# Patient Record
Sex: MALE | Race: WHITE | Employment: PART TIME | URBAN - METROPOLITAN AREA
[De-identification: names, ages, dates, MRNs, and addresses within clinical notes are randomized per-mention and may not be internally consistent; named-entity substitution may affect disease eponyms.]

---

## 2018-10-03 ENCOUNTER — HOSPITAL ENCOUNTER (OUTPATIENT)
Dept: GENERAL RADIOLOGY | Age: 38
Discharge: HOME | End: 2018-10-03
Payer: COMMERCIAL

## 2018-10-03 DIAGNOSIS — M47.816 SPONDYLOSIS OF LUMBAR REGION WITHOUT MYELOPATHY OR RADICULOPATHY: ICD-10-CM

## 2018-10-03 DIAGNOSIS — M54.6 PAIN IN THORACIC SPINE: ICD-10-CM

## 2018-10-03 DIAGNOSIS — M54.50 LOW BACK PAIN: ICD-10-CM

## 2018-10-03 PROCEDURE — 72110 X-RAY EXAM L-2 SPINE 4/>VWS: CPT

## 2018-10-03 PROCEDURE — 72072 X-RAY EXAM THORAC SPINE 3VWS: CPT

## 2020-06-16 ENCOUNTER — ED HISTORICAL/CONVERTED ENCOUNTER (OUTPATIENT)
Dept: OTHER | Age: 40
End: 2020-06-16

## 2020-06-25 ENCOUNTER — OP HISTORICAL/CONVERTED ENCOUNTER (OUTPATIENT)
Dept: OTHER | Age: 40
End: 2020-06-25

## 2020-07-09 ENCOUNTER — OP HISTORICAL/CONVERTED ENCOUNTER (OUTPATIENT)
Dept: OTHER | Age: 40
End: 2020-07-09

## 2020-07-25 ENCOUNTER — ED HISTORICAL/CONVERTED ENCOUNTER (OUTPATIENT)
Dept: OTHER | Age: 40
End: 2020-07-25

## 2020-09-16 VITALS
WEIGHT: 270 LBS | BODY MASS INDEX: 33.57 KG/M2 | SYSTOLIC BLOOD PRESSURE: 150 MMHG | HEIGHT: 75 IN | TEMPERATURE: 98.2 F | DIASTOLIC BLOOD PRESSURE: 94 MMHG

## 2020-09-16 RX ORDER — SULFAMETHOXAZOLE AND TRIMETHOPRIM 800; 160 MG/1; MG/1
1 TABLET ORAL 2 TIMES DAILY
COMMUNITY

## 2020-09-16 RX ORDER — ATOMOXETINE 40 MG/1
40 CAPSULE ORAL DAILY
COMMUNITY

## 2020-09-16 RX ORDER — HYDROCODONE BITARTRATE AND ACETAMINOPHEN 5; 325 MG/1; MG/1
TABLET ORAL
COMMUNITY

## 2020-09-16 RX ORDER — DIVALPROEX SODIUM 250 MG/1
TABLET, DELAYED RELEASE ORAL 3 TIMES DAILY
COMMUNITY

## 2020-09-16 RX ORDER — DULOXETIN HYDROCHLORIDE 20 MG/1
20 CAPSULE, DELAYED RELEASE ORAL DAILY
COMMUNITY

## 2020-09-16 RX ORDER — TAMSULOSIN HYDROCHLORIDE 0.4 MG/1
0.4 CAPSULE ORAL DAILY
COMMUNITY

## 2020-09-16 RX ORDER — PREDNISONE 5 MG/1
TABLET ORAL
COMMUNITY

## 2020-09-16 RX ORDER — ONDANSETRON 4 MG/1
4 TABLET, ORALLY DISINTEGRATING ORAL
COMMUNITY

## 2020-09-16 RX ORDER — MELOXICAM 15 MG/1
15 TABLET ORAL DAILY
COMMUNITY

## 2020-09-16 RX ORDER — GABAPENTIN 400 MG/1
400 CAPSULE ORAL 3 TIMES DAILY
COMMUNITY

## 2022-06-02 ENCOUNTER — HOSPITAL ENCOUNTER (EMERGENCY)
Age: 42
Discharge: HOME | End: 2022-06-02
Attending: EMERGENCY MEDICINE
Payer: MEDICAID

## 2022-06-02 VITALS
DIASTOLIC BLOOD PRESSURE: 90 MMHG | SYSTOLIC BLOOD PRESSURE: 139 MMHG | BODY MASS INDEX: 32.08 KG/M2 | WEIGHT: 258 LBS | OXYGEN SATURATION: 98 % | HEART RATE: 86 BPM | TEMPERATURE: 97.9 F | HEIGHT: 75 IN | RESPIRATION RATE: 14 BRPM

## 2022-06-02 DIAGNOSIS — J02.9 PHARYNGITIS, UNSPECIFIED ETIOLOGY: ICD-10-CM

## 2022-06-02 DIAGNOSIS — B34.9 VIRAL ILLNESS: ICD-10-CM

## 2022-06-02 DIAGNOSIS — H10.32 ACUTE CONJUNCTIVITIS OF LEFT EYE, UNSPECIFIED ACUTE CONJUNCTIVITIS TYPE: Primary | ICD-10-CM

## 2022-06-02 DIAGNOSIS — I10 HYPERTENSION, UNSPECIFIED TYPE: ICD-10-CM

## 2022-06-02 LAB
DEPRECATED S PYO AG THROAT QL EIA: NEGATIVE
FLUAV AG NPH QL IA: NEGATIVE
FLUBV AG NOSE QL IA: NEGATIVE

## 2022-06-02 PROCEDURE — 87804 INFLUENZA ASSAY W/OPTIC: CPT

## 2022-06-02 PROCEDURE — 87880 STREP A ASSAY W/OPTIC: CPT

## 2022-06-02 PROCEDURE — 87070 CULTURE OTHR SPECIMN AEROBIC: CPT

## 2022-06-02 PROCEDURE — 99283 EMERGENCY DEPT VISIT LOW MDM: CPT

## 2022-06-02 RX ORDER — IBUPROFEN 600 MG/1
600 TABLET ORAL
Qty: 20 TABLET | Refills: 0 | Status: SHIPPED | OUTPATIENT
Start: 2022-06-02

## 2022-06-02 RX ORDER — GENTAMICIN SULFATE 3 MG/ML
2 SOLUTION/ DROPS OPHTHALMIC EVERY 4 HOURS
Qty: 3 ML | Refills: 0 | Status: SHIPPED | OUTPATIENT
Start: 2022-06-02 | End: 2022-06-07

## 2022-06-02 NOTE — ED PROVIDER NOTES
EMERGENCY DEPARTMENT HISTORY AND PHYSICAL EXAM      Date: 6/2/2022  Patient Name: Laron Hutton III    History of Presenting Illness     Chief Complaint   Patient presents with    Sore Throat    Red Eye       History Provided By: Patient    HPI: Gold Cisse, 35 y.o. male with a past medical history significant diabetes and hypertension presents to the ED with cc of Left eye discharge 1 day, nasal congestion and sore throat, 3 days. Nothing helps. No anosmia or ageusia. No fever or chills. He is not CoVID vaccinated. There are no other complaints, changes, or physical findings at this time. PCP: None    No current facility-administered medications on file prior to encounter. Current Outpatient Medications on File Prior to Encounter   Medication Sig Dispense Refill    atomoxetine (STRATTERA) 40 mg capsule Take 40 mg by mouth daily.  divalproex DR (DEPAKOTE) 250 mg tablet Take  by mouth three (3) times daily.  DULoxetine (CYMBALTA) 20 mg capsule Take 20 mg by mouth daily.  gabapentin (NEURONTIN) 400 mg capsule Take 400 mg by mouth three (3) times daily.  HYDROcodone-acetaminophen (NORCO) 5-325 mg per tablet Take  by mouth.  meloxicam (MOBIC) 15 mg tablet Take 15 mg by mouth daily.  ondansetron (ZOFRAN ODT) 4 mg disintegrating tablet Take 4 mg by mouth every eight (8) hours as needed for Nausea or Vomiting.  predniSONE (DELTASONE) 5 mg tablet Take  by mouth.  trimethoprim-sulfamethoxazole (BACTRIM DS, SEPTRA DS) 160-800 mg per tablet Take 1 Tab by mouth two (2) times a day.  tamsulosin (FLOMAX) 0.4 mg capsule Take 0.4 mg by mouth daily.          Past History     Past Medical History:  Past Medical History:   Diagnosis Date    Arthritis     Asthma     Bone disease     Calculus of kidney        Past Surgical History:  Past Surgical History:   Procedure Laterality Date    HX HEENT      wisdom teeth    HX OTHER SURGICAL      procedure on wrist Family History:  Family History   Problem Relation Age of Onset    Diabetes Father        Social History:  Social History     Tobacco Use    Smoking status: Former Smoker     Quit date: 2006     Years since quittin.0    Smokeless tobacco: Never Used   Vaping Use    Vaping Use: Never used   Substance Use Topics    Alcohol use: Yes     Comment: occaisonal    Drug use: Never       Allergies:  No Known Allergies      Review of Systems     Review of Systems   Constitutional: Negative. HENT: Positive for congestion and sore throat. Eyes: Positive for discharge. Respiratory: Negative. Cardiovascular: Negative. Gastrointestinal: Negative. Genitourinary: Negative. Neurological: Negative. All other systems reviewed and are negative. Physical Exam     Physical Exam  Vitals and nursing note reviewed. Constitutional:       Appearance: He is well-developed. HENT:      Head: Normocephalic. Nose: Congestion present. No rhinorrhea. Mouth/Throat:      Mouth: Mucous membranes are moist.      Pharynx: No oropharyngeal exudate or posterior oropharyngeal erythema. Eyes:      Comments: Left injected. Cardiovascular:      Rate and Rhythm: Normal rate and regular rhythm. Pulmonary:      Effort: Pulmonary effort is normal.      Breath sounds: Normal breath sounds. Neurological:      Mental Status: He is alert.          Lab and Diagnostic Study Results     Labs -     Recent Results (from the past 12 hour(s))   INFLUENZA A & B AG (RAPID TEST)    Collection Time: 22  7:52 AM   Result Value Ref Range    Influenza A Antigen Negative Negative      Influenza B Antigen Negative Negative     STREP AG SCREEN, GROUP A    Collection Time: 22  7:52 AM    Specimen: Throat   Result Value Ref Range    Group A Strep Ag ID Negative         Radiologic Studies -   @lastxrresult@  CT Results  (Last 48 hours)    None        CXR Results  (Last 48 hours)    None            Medical Decision Making   - I am the first provider for this patient. - I reviewed the vital signs, available nursing notes, past medical history, past surgical history, family history and social history. - Initial assessment performed. The patients presenting problems have been discussed, and they are in agreement with the care plan formulated and outlined with them. I have encouraged them to ask questions as they arise throughout their visit. Vital Signs-Reviewed the patient's vital signs. Patient Vitals for the past 12 hrs:   Temp Pulse Resp BP SpO2   06/02/22 0711 97.9 °F (36.6 °C) 86 14 (!) 139/90 98 %       Records Reviewed: Nursing Notes    The patient presents with       ED Course:          Provider Notes (Medical Decision Making): MDM       Procedures   Medical Decision Makingedical Decision Making  Performed by: Nery Guzman MD  PROCEDURES:  Procedures       Disposition   Disposition: DC- Adult Discharges: All of the diagnostic tests were reviewed and questions answered. Diagnosis, care plan and treatment options were discussed. The patient understands the instructions and will follow up as directed. The patients results have been reviewed with them. They have been counseled regarding their diagnosis. The patient verbally convey understanding and agreement of the signs, symptoms, diagnosis, treatment and prognosis and additionally agrees to follow up as recommended with their PCP in 24 - 48 hours. They also agree with the care-plan and convey that all of their questions have been answered. I have also put together some discharge instructions for them that include: 1) educational information regarding their diagnosis, 2) how to care for their diagnosis at home, as well a 3) list of reasons why they would want to return to the ED prior to their follow-up appointment, should their condition change. Discharged    DISCHARGE PLAN:  1.    Current Discharge Medication List      CONTINUE these medications which have NOT CHANGED    Details   atomoxetine (STRATTERA) 40 mg capsule Take 40 mg by mouth daily. divalproex DR (DEPAKOTE) 250 mg tablet Take  by mouth three (3) times daily. DULoxetine (CYMBALTA) 20 mg capsule Take 20 mg by mouth daily. gabapentin (NEURONTIN) 400 mg capsule Take 400 mg by mouth three (3) times daily. HYDROcodone-acetaminophen (NORCO) 5-325 mg per tablet Take  by mouth.      meloxicam (MOBIC) 15 mg tablet Take 15 mg by mouth daily. ondansetron (ZOFRAN ODT) 4 mg disintegrating tablet Take 4 mg by mouth every eight (8) hours as needed for Nausea or Vomiting. predniSONE (DELTASONE) 5 mg tablet Take  by mouth. trimethoprim-sulfamethoxazole (BACTRIM DS, SEPTRA DS) 160-800 mg per tablet Take 1 Tab by mouth two (2) times a day. tamsulosin (FLOMAX) 0.4 mg capsule Take 0.4 mg by mouth daily. 2.   Follow-up Information     Follow up With Specialties Details Why Contact Info    Rishi Wang MD Internal Medicine Physician In 1 week  [address] [place] [address] [phone]          3. Return to ED if worse   4. Current Discharge Medication List      START taking these medications    Details   gentamicin (GARAMYCIN) 0.3 % ophthalmic solution Administer 2 Drops to left eye every four (4) hours for 5 days. Qty: 3 mL, Refills: 0  Start date: 6/2/2022, End date: 6/7/2022      benzocaine-menthoL (Cepacol Sore Throat, danisha-men,) 15-2.6 mg lozg lozenge Take 1 Lozenge by mouth every two (2) hours as needed for Sore throat. Qty: 15 Lozenge, Refills: 0  Start date: 6/2/2022      ibuprofen (MOTRIN) 600 mg tablet Take 1 Tablet by mouth every six (6) hours as needed for Pain. Qty: 20 Tablet, Refills: 0  Start date: 6/2/2022               Diagnosis     Clinical Impression:   1. Acute conjunctivitis of left eye, unspecified acute conjunctivitis type    2. Pharyngitis, unspecified etiology    3. Hypertension, unspecified type    4.  Viral illness        Attestations:    Cindy Gallegos MD    Please note that this dictation was completed with [employer], the computer voice recognition software. Quite often unanticipated grammatical, syntax, homophones, and other interpretive errors are inadvertently transcribed by the computer software. Please disregard these errors. Please excuse any errors that have escaped final proofreading. Thank you.

## 2022-06-03 LAB
BACTERIA SPEC CULT: NORMAL
SPECIAL REQUESTS,SREQ: NORMAL

## 2022-11-15 ENCOUNTER — NURSE TRIAGE (OUTPATIENT)
Dept: OTHER | Facility: CLINIC | Age: 42
End: 2022-11-15

## 2022-11-15 ENCOUNTER — OFFICE VISIT (OUTPATIENT)
Dept: FAMILY MEDICINE CLINIC | Age: 42
End: 2022-11-15
Payer: MEDICAID

## 2022-11-15 VITALS
DIASTOLIC BLOOD PRESSURE: 93 MMHG | SYSTOLIC BLOOD PRESSURE: 149 MMHG | HEART RATE: 90 BPM | TEMPERATURE: 97.1 F | WEIGHT: 257 LBS | BODY MASS INDEX: 31.95 KG/M2 | HEIGHT: 75 IN | RESPIRATION RATE: 16 BRPM | OXYGEN SATURATION: 97 %

## 2022-11-15 DIAGNOSIS — I10 PRIMARY HYPERTENSION: Primary | ICD-10-CM

## 2022-11-15 DIAGNOSIS — Z76.89 ENCOUNTER TO ESTABLISH CARE: ICD-10-CM

## 2022-11-15 PROCEDURE — 99204 OFFICE O/P NEW MOD 45 MIN: CPT

## 2022-11-15 PROCEDURE — 3075F SYST BP GE 130 - 139MM HG: CPT

## 2022-11-15 PROCEDURE — 3079F DIAST BP 80-89 MM HG: CPT

## 2022-11-15 RX ORDER — LOSARTAN POTASSIUM 25 MG/1
25 TABLET ORAL DAILY
Qty: 30 TABLET | Refills: 1 | Status: SHIPPED | OUTPATIENT
Start: 2022-11-15

## 2022-11-15 NOTE — PROGRESS NOTES
[place] is a 38 y.o. male    Chief Complaint   Patient presents with    Establish Care    Hypertension       1. Have you been to the ER, urgent care clinic since your last visit? Hospitalized since your last visit? No  2. Have you seen or consulted any other health care providers outside of the [address] [place] [address] since your last visit? Include any pap smears or colon screening.  No    Visit Vitals  BP (!) 149/93 (BP 1 Location: Left upper arm, BP Patient Position: Sitting)   Pulse 90   Temp 97.1 °F (36.2 °C) (Temporal)   Resp 16   Ht 6' 3\" (1.905 m)   Wt 257 lb (116.6 kg)   SpO2 97%   BMI 32.12 kg/m²     3 most recent PHQ Screens 11/15/2022   Little interest or pleasure in doing things Several days   Feeling down, depressed, irritable, or hopeless Several days   Total Score PHQ 2 2   Trouble falling or staying asleep, or sleeping too much More than half the days   Feeling tired or having little energy Nearly every day   Poor appetite, weight loss, or overeating Not at all   Feeling bad about yourself - or that you are a failure or have let yourself or your family down Several days   Trouble concentrating on things such as school, work, reading, or watching TV Nearly every day   Moving or speaking so slowly that other people could have noticed; or the opposite being so fidgety that others notice Several days   Thoughts of being better off dead, or hurting yourself in some way Not at all   PHQ 9 Score 12   How difficult have these problems made it for you to do your work, take care of your home and get along with others Somewhat difficult     Health Maintenance Due   Topic Date Due    Hepatitis C Screening  Never done    DTaP/Tdap/Td series (1 - Tdap) Never done     Has been told in the past he has had high bp never been on medication    Previous PCP:  Dr. Pina Aguero in [address] , [place] [address]

## 2022-11-15 NOTE — PROGRESS NOTES
Subjective   Pancho Pichardo III is an 38 y.o. male who presents to establish care. Patient was previously receiving care at: , Saw him last a year ago. Currently not having any complaints. Not currently taking any medications. Alchohol consumption  - Drinks a liter of liquor every two weeks. 2. Elevated BP  - Previously diagnosed with hypertension, but never been on medication for it. Was recently at a dentist for a root canal and he was found to have a blood pressure of 180/108. They told him at that visit that they cannot proceed with the root canal until he has been seen by his PCP and treated for the BP.  - Has a BP machine at home, but has not been checking it. - Says he is too scared to check his blood pressure. - Denies any drug use, never smoked. - Feels tired often. Has been told he snores in his sleep. Stop-bang score is 7/8.    3. Possible diabetes  - Says he has been diagnosed with borderline diabetes in the past. Never been on medicines for it. - Eats diet heavy in meat, canned fruit, sandwiches, pizza, steak, potatoes. Doesn't eat junk food often. - Drinks mostly milk and juice. Some water. 4. Asthma  - Has not had any asthma attacks since he has been young. Sometimes feels a little short of breath but those go away spontaneously. Does not have a rescue inhaler and has not needed one. 5. Psych  - Patient says he has been diagnosed with Depression and Bipolar Disorder. He is not currently on any medications. Was diagnosed by a \"therapist\" that he no longer sees. He stopped seeing them when they took him off a medication that was working for him. He says that he was taken off it due to risk of abuse. Cannot remember the name of that medication. Review of Systems   Negative except per HPI. Current Medications  Current medications include:   Current Outpatient Medications   Medication Sig    losartan (COZAAR) 25 mg tablet Take 1 Tablet by mouth daily. benzocaine-menthoL (Cepacol Sore Throat, danisha-men,) 15-2.6 mg lozg lozenge Take 1 Lozenge by mouth every two (2) hours as needed for Sore throat. (Patient not taking: Reported on 11/15/2022)    ibuprofen (MOTRIN) 600 mg tablet Take 1 Tablet by mouth every six (6) hours as needed for Pain. (Patient not taking: Reported on 11/15/2022)    atomoxetine (STRATTERA) 40 mg capsule Take 40 mg by mouth daily. (Patient not taking: Reported on 11/15/2022)    divalproex DR (DEPAKOTE) 250 mg tablet Take  by mouth three (3) times daily. (Patient not taking: Reported on 11/15/2022)    DULoxetine (CYMBALTA) 20 mg capsule Take 20 mg by mouth daily. (Patient not taking: Reported on 11/15/2022)    gabapentin (NEURONTIN) 400 mg capsule Take 400 mg by mouth three (3) times daily. (Patient not taking: Reported on 11/15/2022)    HYDROcodone-acetaminophen (NORCO) 5-325 mg per tablet Take  by mouth. (Patient not taking: Reported on 11/15/2022)    meloxicam (MOBIC) 15 mg tablet Take 15 mg by mouth daily. (Patient not taking: Reported on 11/15/2022)    ondansetron (ZOFRAN ODT) 4 mg disintegrating tablet Take 4 mg by mouth every eight (8) hours as needed for Nausea or Vomiting. (Patient not taking: Reported on 11/15/2022)    predniSONE (DELTASONE) 5 mg tablet Take  by mouth. (Patient not taking: Reported on 11/15/2022)    tamsulosin (FLOMAX) 0.4 mg capsule Take 0.4 mg by mouth daily. (Patient not taking: Reported on 11/15/2022)     No current facility-administered medications for this visit.        Allergies  No Known Allergies    Past Medical History  Past Medical History:   Diagnosis Date    Arthritis     Asthma     Bone disease     Calculus of kidney        Past Surgical History   Past Surgical History:   Procedure Laterality Date    HX HEENT      wisdom teeth    HX OTHER SURGICAL      procedure on wrist        Family History  Family History   Problem Relation Age of Onset    Diabetes Father        Social History  Social History Socioeconomic History    Marital status: LEGALLY      Spouse name: Not on file    Number of children: Not on file    Years of education: Not on file    Highest education level: Not on file   Occupational History    Not on file   Tobacco Use    Smoking status: Former     Types: Cigarettes     Quit date: 2006     Years since quittin.4    Smokeless tobacco: Never   Vaping Use    Vaping Use: Never used   Substance and Sexual Activity    Alcohol use: Yes     Comment: occaisonal    Drug use: Never    Sexual activity: Not on file   Other Topics Concern    Not on file   Social History Narrative    Not on file     Social Determinants of Health     Financial Resource Strain: Not on file   Food Insecurity: Not on file   Transportation Needs: Not on file   Physical Activity: Not on file   Stress: Not on file   Social Connections: Not on file   Intimate Partner Violence: Not on file   Housing Stability: Not on file       Immunizations    There is no immunization history on file for this patient. Health Maintenance  Colonoscopy : never done  HIV testing : never done    Objective   Vital Signs  Visit Vitals  BP (!) 143/85 (BP 1 Location: Right arm, BP Patient Position: Sitting)   Pulse 90   Temp 97.1 °F (36.2 °C) (Temporal)   Resp 16   Ht 6' 3\" (1.905 m)   Wt 257 lb (116.6 kg)   SpO2 97%   BMI 32.12 kg/m²       Physical Exam  HENT:      Mouth/Throat:      Mouth: Mucous membranes are moist.      Pharynx: Oropharynx is clear. Eyes:      Extraocular Movements: Extraocular movements intact. Conjunctiva/sclera: Conjunctivae normal.      Pupils: Pupils are equal, round, and reactive to light. Cardiovascular:      Rate and Rhythm: Normal rate and regular rhythm. Pulses: Normal pulses. Heart sounds: Normal heart sounds. Pulmonary:      Effort: Pulmonary effort is normal.      Breath sounds: Normal breath sounds. Abdominal:      General: Abdomen is flat.  Bowel sounds are normal. Palpations: Abdomen is soft. Skin:     General: Skin is warm and dry. Neurological:      Mental Status: He is alert and oriented to person, place, and time. Psychiatric:         Mood and Affect: Mood normal.         Behavior: Behavior normal.       Assessment:   Vanita Valentino III is a 38 y.o. here to establish to care     Plan:     1. Encounter to establish care      2. BMI 32.0-32.9,adult  - LIPID PANEL; Future  - HEMOGLOBIN A1C WITH EAG; Future    3. Primary hypertension  - SLEEP MEDICINE REFERRAL  - METABOLIC PANEL, COMPREHENSIVE; Future  - MICROALBUMIN, UR, RAND W/ MICROALB/CREAT RATIO; Future  - losartan (COZAAR) 25 mg tablet; Take 1 Tablet by mouth daily. Dispense: 30 Tablet; Refill: 1     - Patient has two documented blood pressure values, here and at the dentist. The BP re-check at this visit is 149/93. This qualifies for a diagnosis of hypertension.   - Will obtain CMP, microalbumin:cr ratio, A1c, and lipid panel  - Will start on losartan 25 mg PO daily. Patient to follow up in three weeks to recheck his BP.  - Will request records from the last PCP. -Patient informed about [facility], where they can find information about what foods are encouraged as part of a healthy diet, as well as shopping lists and meal plans. This information can be found on the website under the \"patient\" tab. They have also been given a handout on the \"Six Pillars of Lifestyle Medicine\" provided on the website for review. This handout discusses the following and the role they play in improving health and wellbeing:   Whole food, plant-based nutrition  Physical activity  Stress management  Avoidance of risky substances  Restorative sleep  Social connection  Patient has been encouraged to review this information and to schedule a follow up appointment to address any of these six pillars that we can help them improve. I discussed the aforementioned diagnoses with the patient as well as the plan of care. All questions were answered and an AVS was provided.      I discussed this patient with Dr. Jorge Shaw MD (Attending Physician)      Signed By:  Tani Miller MD

## 2022-11-15 NOTE — TELEPHONE ENCOUNTER
Location of [place]: [place]    Received call from YOSELIN at [facility] with [employer] Complaint; Patient with Red Flag Complaint requesting to establish care with [facility] . Subjective: Caller states \"Dentist told me to follow up with PCP for high blood pressure. They are unable to do any procedures until it is under control\"     Current Symptoms: 160/100s on home cuff. Readings at dentist was lowest of 170/100 and highest of 180/103. Intermittent double vision and headaches. Onset: a few days ago; unchanged    Associated Symptoms: NA    Pain Severity: 0/10; Temperature: No     What has been tried: NA    LMP: NA Pregnant: NA    Recommended disposition: See PCP within 3 Days    Care advice provided, patient verbalizes understanding; denies any other questions or concerns; instructed to call back for any new or worsening symptoms. Patient/Caller agrees with recommended disposition; writer provided warm transfer to [facility] at [facility] for appointment scheduling    Attention Provider: Thank you for allowing me to participate in the care of your patient. The patient was connected to triage in response to information provided to the [facility]. Please do not respond through this encounter as the response is not directed to a shared pool.

## 2022-11-15 NOTE — TELEPHONE ENCOUNTER
Location of patient: {specify State}    Received call from *** at {ECC:23226} [facility] with Red Flag Complaint. Subjective: Caller states \"{type here}\"     Current Symptoms: ***    Onset: {NUMBERS; 0-10:10463} {TIME UNITS:11} ago; {DESC; IEEGS:03288}    Associated Symptoms: {GEN ADL:81207}    Pain Severity: {NUMBERS; 0-10:5044}/10; {PAIN QUALITY:76633205}; {Desc; pain severity:62068}    Temperature: *** {Desc; oral/axillary/rectal:55026}    What has been tried: ***    LMP: {ENC:91944} Pregnant: {YES NO UNKN XR:35055}    Recommended disposition: {Clinical Disposition:53637}    Care advice provided, patient verbalizes understanding; denies any other questions or concerns; instructed to call back for any new or worsening symptoms. {OUTCOME:50237}    Attention Provider: Thank you for allowing me to participate in the care of your patient. The patient was connected to triage in response to information provided to the [facility]. Please do not respond through this encounter as the response is not directed to a shared pool.     Reminders:     Beginning 5/23/22 - document all [facility] patients in [facility]; call origin: [facility]    Call [address] [place] [address] [address] Provider/Office    Care Advice - both instruction and documentation    Routing - PCP     PLEASE DELETE ALL RED TEXT PRIOR TO SIGNING NOTE    Reason for Disposition   Systolic BP >= 061 OR Diastolic >= 462    Protocols used: Blood Pressure - High-ADULT-[place]

## 2022-11-16 LAB
ALBUMIN SERPL-MCNC: 4.9 G/DL (ref 4–5)
ALBUMIN/CREAT UR: 5 MG/G CREAT (ref 0–29)
ALBUMIN/GLOB SERPL: 1.9 {RATIO} (ref 1.2–2.2)
ALP SERPL-CCNC: 134 IU/L (ref 44–121)
ALT SERPL-CCNC: 79 IU/L (ref 0–44)
AST SERPL-CCNC: 40 IU/L (ref 0–40)
BILIRUB SERPL-MCNC: 0.5 MG/DL (ref 0–1.2)
BUN SERPL-MCNC: 14 MG/DL (ref 6–24)
BUN/CREAT SERPL: 14 (ref 9–20)
CALCIUM SERPL-MCNC: 9.4 MG/DL (ref 8.7–10.2)
CHLORIDE SERPL-SCNC: 101 MMOL/L (ref 96–106)
CHOLEST SERPL-MCNC: 220 MG/DL (ref 100–199)
CO2 SERPL-SCNC: 22 MMOL/L (ref 20–29)
CREAT SERPL-MCNC: 1.01 MG/DL (ref 0.76–1.27)
CREAT UR-MCNC: 283.3 MG/DL
EGFR: 94 ML/MIN/1.73
EST. AVERAGE GLUCOSE BLD GHB EST-MCNC: 114 MG/DL
GLOBULIN SER CALC-MCNC: 2.6 G/DL (ref 1.5–4.5)
GLUCOSE SERPL-MCNC: 89 MG/DL (ref 70–99)
HBA1C MFR BLD: 5.6 % (ref 4.8–5.6)
HDLC SERPL-MCNC: 32 MG/DL
IMP & REVIEW OF LAB RESULTS: NORMAL
LDLC SERPL CALC-MCNC: 136 MG/DL (ref 0–99)
MICROALBUMIN UR-MCNC: 14.1 UG/ML
POTASSIUM SERPL-SCNC: 4.4 MMOL/L (ref 3.5–5.2)
PROT SERPL-MCNC: 7.5 G/DL (ref 6–8.5)
SODIUM SERPL-SCNC: 141 MMOL/L (ref 134–144)
TRIGL SERPL-MCNC: 287 MG/DL (ref 0–149)
VLDLC SERPL CALC-MCNC: 52 MG/DL (ref 5–40)

## 2022-11-22 ENCOUNTER — TELEPHONE (OUTPATIENT)
Dept: FAMILY MEDICINE CLINIC | Age: 42
End: 2022-11-22

## 2022-11-22 NOTE — TELEPHONE ENCOUNTER
Spoke with the patient on the phone regarding his lab results, particularly his elevated cholesterol. He has a follow up visit scheduled with me on the 6th and he mentioned that he would be open to discussing medical management of his hyperlipidemia at that visit. We will go over the medication options and make final decisions at that visit.

## 2022-11-28 ENCOUNTER — OFFICE VISIT (OUTPATIENT)
Dept: SLEEP MEDICINE | Age: 42
End: 2022-11-28
Payer: MEDICAID

## 2022-11-28 VITALS
BODY MASS INDEX: 31.77 KG/M2 | DIASTOLIC BLOOD PRESSURE: 90 MMHG | OXYGEN SATURATION: 97 % | WEIGHT: 255.5 LBS | HEIGHT: 75 IN | SYSTOLIC BLOOD PRESSURE: 141 MMHG | HEART RATE: 92 BPM

## 2022-11-28 DIAGNOSIS — I10 PRIMARY HYPERTENSION: ICD-10-CM

## 2022-11-28 DIAGNOSIS — G47.33 OSA (OBSTRUCTIVE SLEEP APNEA): Primary | ICD-10-CM

## 2022-11-28 DIAGNOSIS — F41.9 ANXIETY AND DEPRESSION: ICD-10-CM

## 2022-11-28 DIAGNOSIS — F32.A ANXIETY AND DEPRESSION: ICD-10-CM

## 2022-11-28 PROCEDURE — 3075F SYST BP GE 130 - 139MM HG: CPT | Performed by: INTERNAL MEDICINE

## 2022-11-28 PROCEDURE — 99204 OFFICE O/P NEW MOD 45 MIN: CPT | Performed by: INTERNAL MEDICINE

## 2022-11-28 PROCEDURE — 3079F DIAST BP 80-89 MM HG: CPT | Performed by: INTERNAL MEDICINE

## 2022-11-28 NOTE — PROGRESS NOTES
217 [facility] [place]. [place], [address]  Tel.  [phone]  Fax. 100 [facility] 60  [place], [address] [facility]  Tel.  [phone]  Fax. [phone] [address]  Tel.  [phone]  Fax. [phone] [facility] Tessa III is a 38y.o. year old male referred by Dr. Afshan Russell  for evaluation of a sleep disorder. ASSESSMENT/PLAN:      ICD-10-CM ICD-9-CM    1. RADHA (obstructive sleep apnea)  G47.33 327.23 SLEEP STUDY UNATTENDED, 4 CHANNEL      2. Anxiety and depression  F41.9 300.00     F32. A 311       3. Primary hypertension  I10 401.9       4. BMI 31.0-31.9,adult  Z68.31 V85.31           Patient has a history and examination consistent with the diagnosis of sleep apnea. Follow-up and Dispositions    Return for telephone follow-up after testing is completed. * The patient currently has a High Risk for having sleep apnea. STOP-BANG score 6.    * Sleep testing was ordered for initial evaluation. Orders Placed This Encounter    SLEEP STUDY UNATTENDED, 4 CHANNEL     Order Specific Question:   Reason for Exam     Answer:   RADHA       * He was provided information on sleep apnea including corresponding risk factors and the importance of proper treatment. * Treatment options were reviewed in detail. he would like to proceed with PAP therapy. Patient will be seen in follow-up in 6-8 weeks after PAP setup to gauge treatment response and adherence to therapy. * The patient was counseled regarding proper sleep hygiene, with emphasis on ensuring sufficient total sleep time; safe driving and the benefits of exercise and weight loss. * All of his questions were addressed. 2. Anxiety and Depression -  currently not on medications, he will continue to monitor his mood and follow up with his care provider for reevaluation/adjustment of medications if warranted.   I have reviewed the relationship between mood as it relates to sleep-disordered breathing. 3. Hypertension -  continue on current regimen - losartan (COZAAR) 25 mg tablet, he will continue to monitor his BP and follow up with his primary care provider for reevaluation/adjustment of medications if warranted. I have reviewed the relationship between hypertension as it relates to sleep-disordered breathing. 4. Recommended a dedicated weight loss program through appropriate diet and exercise regimen as significant weight reduction has been shown to reduce severity of obstructive sleep apnea. SUBJECTIVE/OBJECTIVE:    Marguerite Melgar III is an 38 y.o. male referred for evaluation for a sleep disorder. He complains of snoring associated with snorting, choking, periods of not breathing, tossing and turning, kicking, awakening in the middle of the night because of urination along with difficulty falling asleep once awakened. Patient reports of poor sleep quality which is not restorative, she feels tired and fatigued during the day. Symptoms began 4 years ago, unchanged since that time. He usually can fall asleep in 10 minutes to an hour. Family or house members note snoring and periods of not breathing. He denies of symptoms indicative of cataplexy, sleep paralysis or sleep related hallucinations. He denies of a history of unusual movements occurring during sleep. Marguerite Melgar III does not wake up frequently at night. He is not bothered by waking up too early and left unable to get back to sleep. He actually sleeps about   hours at night and wakes up about -2 times during the night. He does work shifts: Other Shift. Celia Christensen indicates he does get too little sleep at night. His bedtime is  (varies). He awakens at 36. He does take naps. He takes 7 naps a week lasting Other. He has the following observed behaviors: Loud snoring, Sleep talking, Pauses in breathing, Twitching of legs or feet, Grinding teeth, Kicking with legs; Other (use comments).   Other remarks: waking with a gasp or snort    The patient has not undergone diagnostic testing for the current problems. Review of Systems:  Constitutional:  No significant weight loss or weight gain  Eyes:  Intermittent blurred vision - to be evaluated by PCP  CVS:  No significant chest pain  Pulm: significant shortness of breath on exertion  GI:  No significant nausea or vomiting  : significant nocturia  Musculoskeletal:  No significant joint pain at night  Skin:  No significant rashes  Neuro:  No significant dizziness   Psych:  No active mood issues    Sleep Review of Systems: notable for Positive difficulty falling asleep; Positive awakenings at night; Negative perceived regular dreaming; Negative nightmares; Negative  early morning headaches; Negative  memory problems; Negative  concentration issues; Negative caffeine;  Negative alcohol;   Negative history of any automobile or occupational accidents due to daytime drowsiness. [place] Sleepiness Score: 21   and Modified F.O.S.Q. Score Total / 2: 11.5    Visit Vitals  BP (!) 141/90 (BP 1 Location: Left upper arm, BP Patient Position: Sitting)   Pulse 92   Ht 6' 3\" (1.905 m)   Wt 255 lb 8 oz (115.9 kg)   SpO2 97%   BMI 31.94 kg/m²    Neck circ. in \"inches\": 19.5      General:   Alert, oriented, not in acute distress   Eyes:  Anicteric Sclerae; intact EOM's   Nose:  No obvious nasal septum deviation    Oropharynx:   Mallampati score 4, thick tongue base, uvula not seen due to low-lying soft palate, narrow tonsilo-pharyngeal pilars, tongue scalloped   Neck:   midline trachea,  no JVD   Chest/Lungs:  symmetrical lung expansion ,clear lung fields on auscultation    CVS:  Normal rate, regular rhythm    Extremities:  No obvious rashes, absent edema    Neuro:  No focal deficits;  No obvious tremor    Psych:  Normal eye contact; normal  affect, normal countenance          Lisette Glass MD, FAASM  Diplomate American Board of Sleep Medicine  Diplomate in Sleep Medicine - ABP    Electronically signed.  11/28/22

## 2022-11-28 NOTE — PATIENT INSTRUCTIONS
217 [facility]., [place]. [place], [address]  Tel.  [phone]  Fax. 100 [facility] 60  [place], [address] [facility]  Tel.  [phone]  Fax. [phone] [address]  Tel.  [phone]  Fax. [phone]     Sleep Apnea: After Your Visit  Your Care Instructions  Sleep apnea occurs when you frequently stop breathing for 10 seconds or longer during sleep. It can be mild to severe, based on the number of times per hour that you stop breathing or have slowed breathing. Blocked or narrowed airways in your nose, mouth, or throat can cause sleep apnea. Your airway can become blocked when your throat muscles and tongue relax during sleep. Sleep apnea is common, occurring in 1 out of 20 individuals. Individuals having any of the following characteristics should be evaluated and treated right away due to high risk and detrimental consequences from untreated sleep apnea:  Obesity  Congestive Heart failure  Atrial Fibrillation  Uncontrolled Hypertension  Type II Diabetes  Night-time Arrhythmias  Stroke  Pulmonary Hypertension  High-risk Driving Populations (pilots, truck drivers, etc.)  Patients Considering Weight-loss Surgery    How do you know you have sleep apnea? You probably have sleep apnea if you answer 'yes' to 3 or more of the following questions:  S - Have you been told that you Snore? T - Are you often Tired during the day? O - Has anyone Observed you stop breathing while sleeping? P- Do you have (or are being treated for) high blood Pressure? B - Are you obese (Body Mass Index > 35)? A - Is your Age 48years old or older? N - Is your Neck size greater than 16 inches? G - Are you male Gender? A sleep physician can prescribe a breathing device that prevents tissues in the throat from blocking your airway. Or your doctor may recommend using a dental device (oral breathing device) to help keep your airway open.  In some cases, surgery may be needed to remove enlarged tissues in the throat. Follow-up care is a key part of your treatment and safety. Be sure to make and go to all appointments, and call your doctor if you are having problems. It's also a good idea to know your test results and keep a list of the medicines you take. How can you care for yourself at home? Lose weight, if needed. It may reduce the number of times you stop breathing or have slowed breathing. Go to bed at the same time every night. Sleep on your side. It may stop mild apnea. If you tend to roll onto your back, sew a pocket in the back of your paNaytev top. Put a tennis ball into the pocket, and stitch the pocket shut. This will help keep you from sleeping on your back. Avoid alcohol and medicines such as sleeping pills and sedatives before bed. Do not smoke. Smoking can make sleep apnea worse. If you need help quitting, talk to your doctor about stop-smoking programs and medicines. These can increase your chances of quitting for good. Prop up the head of your bed 4 to 6 inches by putting bricks under the legs of the bed. Treat breathing problems, such as a stuffy nose, caused by a cold or allergies. Use a continuous positive airway pressure (CPAP) breathing machine if lifestyle changes do not help your apnea and your doctor recommends it. The machine keeps your airway from closing when you sleep. If CPAP does not help you, ask your doctor whether you should try other breathing machines. A bilevel positive airway pressure machine has two types of air pressureâone for breathing in and one for breathing out. Another device raises or lowers air pressure as needed while you breathe. If your nose feels dry or bleeds when using one of these machines, talk with your doctor about increasing moisture in the air. A humidifier may help.   If your nose is runny or stuffy from using a breathing machine, talk with your doctor about using decongestants or a corticosteroid nasal spray.  When should you call for help? Watch closely for changes in your health, and be sure to contact your doctor if:  You still have sleep apnea even though you have made lifestyle changes. You are thinking of trying a device such as CPAP. You are having problems using a CPAP or similar machine. Where can you learn more? Go to [employer]. Enter Z098 in the search box to learn more about \"Sleep Apnea: After Your Visit. \"   © 8584-1558 Healthwise, Incorporated. Care instructions adapted under license by [facility] (which disclaims liability or warranty for this information). This care instruction is for use with your licensed healthcare professional. If you have questions about a medical condition or this instruction, always ask your healthcare professional. Elex Furlong any warranty or liability for your use of this information. PROPER SLEEP HYGIENE    What to avoid  Do not have drinks with caffeine, such as coffee or black tea, for 8 hours before bed. Do not smoke or use other types of tobacco near bedtime. Nicotine is a stimulant and can keep you awake. Avoid drinking alcohol late in the evening, because it can cause you to wake in the middle of the night. Do not eat a big meal close to bedtime. If you are hungry, eat a light snack. Do not drink a lot of water close to bedtime, because the need to urinate may wake you up during the night. Do not read or watch TV in bed. Use the bed only for sleeping and sexual activity. What to try  Go to bed at the same time every night, and wake up at the same time every morning. Do not take naps during the day. Keep your bedroom quiet, dark, and cool. Get regular exercise, but not within 3 to 4 hours of your bedtime. .  Sleep on a comfortable pillow and mattress. If watching the clock makes you anxious, turn it facing away from you so you cannot see the time.   If you worry when you lie down, start a worry book. Well before bedtime, write down your worries, and then set the book and your concerns aside. Try meditation or other relaxation techniques before you go to bed. If you cannot fall asleep, get up and go to another room until you feel sleepy. Do something relaxing. Repeat your bedtime routine before you go to bed again. Make your house quiet and calm about an hour before bedtime. Turn down the lights, turn off the TV, log off the computer, and turn down the volume on music. This can help you relax after a busy day. Drowsy Driving  The [address] [place] [address] Road Traffic Safety Administration cites drowsiness as a causing factor in more than 849,162 police reported crashes annually, resulting in 76,000 injuries and 1,500 deaths. Other surveys suggest 55% of people polled have driven while drowsy in the past year, 23% had fallen asleep but not crashed, 3% crashed, and 2% had and accident due to drowsy driving. Who is at risk? Young Drivers: One study of drowsy driving accidents states that 55% of the drivers were under 25 years. Of those, 75% were male. Shift Workers and Travelers: People who work overnight or travel across time zones frequently are at higher risk of experiencing Circadian Rhythm Disorders. They are trying to work and function when their body is programed to sleep. Sleep Deprived: Lack of sleep has a serious impact on your ability to pay attention or focus on a task. Consistently getting less than the average of 8 hours your body needs creates partial or cumulative sleep deprivation. Untreated Sleep Disorders: Sleep Apnea, Narcolepsy, R.L.S., and other sleep disorders (untreated) prevent a person from getting enough restful sleep. This leads to excessive daytime sleepiness and increases the risk for drowsy driving accidents by up to 7 times. Medications / Alcohol: Even over the counter medications can cause drowsiness.  Medications that impair a drivers attention should have a warning label. Alcohol naturally makes you sleepy and on its own can cause accidents. Combined with excessive drowsiness its effects are amplified. Signs of Drowsy Driving:   * You don't remember driving the last few miles   * You may drift out of your trell   * You are unable to focus and your thoughts wander   * You may yawn more often than normal   * You have difficulty keeping your eyes open / nodding off   * Missing traffic signs, speeding, or tailgating  Prevention-   Good sleep hygiene, lifestyle and behavioral choices have the most impact on drowsy driving. There is no substitute for sleep and the average person requires 8 hours nightly. If you find yourself driving drowsy, stop and sleep. Consider the sleep hygiene tips provided during your visit as well. Medication Refill Policy: Refills for all medications require 1 week advance notice. Please have your pharmacy fax a refill request. We are unable to fax, or call in \"controled substance\" medications and you will need to pick these prescriptions up from our office. IPLogic Activation    Thank you for requesting access to [employer]PLogic. Please follow the instructions below to securely access and download your online medical record. IPLogic allows you to send messages to your doctor, view your test results, renew your prescriptions, schedule appointments, and more. How Do I Sign Up? In your internet browser, go to [email or web address]. Click on the First Time User? Click Here link in the Sign In box. You will see the New Member Sign Up page. Enter your IPLogic Access Code exactly as it appears below. You will not need to use this code after youve completed the sign-up process. If you do not sign up before the expiration date, you must request a new code.     IPLogic Access Code: [email or web address]  Expires: 2022  9:43 AM (This is the date your IPLogic access code will )    Enter the last four digits of your Social Security Number (xxxx) and Date of Birth (mm/dd/yyyy) as indicated and click Submit. You will be taken to the next sign-up page. Create a Swipesense ID. This will be your Swipesense login ID and cannot be changed, so think of one that is secure and easy to remember. Create a Swipesense password. You can change your password at any time. Enter your Password Reset Question and Answer. This can be used at a later time if you forget your password. Enter your e-mail address. You will receive e-mail notification when new information is available in [address] Click Sign Up. You can now view and download portions of your medical record. Click the CardMunch link to download a portable copy of your medical information. Additional Information    If you have questions, please call [phone]. Remember, Swipesense is NOT to be used for urgent needs. For medical emergencies, dial 911.

## 2022-12-06 ENCOUNTER — OFFICE VISIT (OUTPATIENT)
Dept: FAMILY MEDICINE CLINIC | Age: 42
End: 2022-12-06
Payer: MEDICAID

## 2022-12-06 VITALS
OXYGEN SATURATION: 96 % | BODY MASS INDEX: 32.11 KG/M2 | RESPIRATION RATE: 18 BRPM | HEART RATE: 88 BPM | SYSTOLIC BLOOD PRESSURE: 146 MMHG | TEMPERATURE: 98 F | HEIGHT: 75 IN | DIASTOLIC BLOOD PRESSURE: 81 MMHG | WEIGHT: 258.25 LBS

## 2022-12-06 DIAGNOSIS — E78.2 MIXED HYPERLIPIDEMIA: ICD-10-CM

## 2022-12-06 DIAGNOSIS — I10 PRIMARY HYPERTENSION: Primary | ICD-10-CM

## 2022-12-06 RX ORDER — LOSARTAN POTASSIUM 25 MG/1
50 TABLET ORAL DAILY
Qty: 90 TABLET | Refills: 1 | Status: SHIPPED | OUTPATIENT
Start: 2022-12-06

## 2022-12-06 NOTE — PROGRESS NOTES
Subjective   Blanca Baeza III is a 38 y.o. male who presents for Follow-up. HPI  - Patient is here for a follow up of hypertension. He was previously seen by me on 11/15/22 after being told by his dentist that his BP was high. I also identified that he was high risk for RADHA and referred him to sleep medicine. They are scheduling a sleep study and also have initiated his CPAP. - Ordered some lab work at the last visit. CMP was normal except for an AST of 79. Microalbumin/Cr ratio was normal.   - Lipid panel was:   Lab Results   Component Value Date/Time    Cholesterol, total 220 (H) 11/15/2022 04:15 PM    HDL Cholesterol 32 (L) 11/15/2022 04:15 PM    LDL, calculated 136 (H) 11/15/2022 04:15 PM    VLDL, calculated 52 (H) 11/15/2022 04:15 PM    Triglyceride 287 (H) 11/15/2022 04:15 PM   - Patient has tolerated losartan well which was started on last visit. He said his BP was 150s/90s at the dentists yesterday. - Patient says that for his whole life he has a hard time staying focused. He says that he has been on medication for that in the past. Believes he has previously been diagnosed with ADHD.  - Patient says he sips alcohol throughout the day often, but not every day. Says he doesn't drink to get drunk. Review of Systems   Negative except per HPI. Medical History  Past Medical History:   Diagnosis Date    Arthritis     Asthma     Bone disease     Calculus of kidney        Medications  Current Outpatient Medications   Medication Sig    losartan (COZAAR) 25 mg tablet Take 2 Tablets by mouth daily. No current facility-administered medications for this visit. Immunizations     There is no immunization history on file for this patient.     Allergies   No Known Allergies    Objective   Vital Signs  Visit Vitals  BP (!) 146/81 (BP 1 Location: Left upper arm, BP Patient Position: Sitting, BP Cuff Size: Large adult)   Pulse 88   Temp 98 °F (36.7 °C) (Temporal)   Resp 18   Ht 6' 3\" (1.905 m)   Wt 258 lb 4 oz (117.1 kg)   SpO2 96%   BMI 32.28 kg/m²       Physical Examination  Physical Exam  Eyes:      Extraocular Movements: Extraocular movements intact. Pupils: Pupils are equal, round, and reactive to light. Cardiovascular:      Rate and Rhythm: Normal rate and regular rhythm. Pulses: Normal pulses. Heart sounds: Normal heart sounds. Pulmonary:      Effort: Pulmonary effort is normal.      Breath sounds: Normal breath sounds. Abdominal:      General: Bowel sounds are normal.      Palpations: Abdomen is soft. Neurological:      Mental Status: He is oriented to person, place, and time. Assessment and Plan   Letty Willett III is a 38 y.o. male who presents for Follow-up. 1. Primary hypertension  - losartan (COZAAR) 25 mg tablet; Take 2 Tablets by mouth daily. Dispense: 90 Tablet; Refill: 1  - BP was 146/81 at this visit. Doubled Losartan to 50mg once daily.  - Patient also counseled that regular drinking can increase blood pressure as well as cause damage to the liver. Verbalized understanding. Will check in again at next visit. - He will continue to follow up with sleep medicine for treatment of his RADHA. - Also counseled that treatment for ADHD might complicate his HTN treatment, and patient offered referral to Neuropsych for official testing and diagnosis. Denied at this time. - Will follow up in 2 months for BP recheck and repeat BMP to monitor creatinine. 2. Mixed hyperlipidemia  - Patient ASCVD risk score is 5.6. Not a candidate for statin at this time. However, lifetime risk for cardiovascular event is high given risk factors. Counseled on lifestyle changes, changes to his diet and increased exercise. - Will follow up on lifestyle changes at next visit. Return in about 2 months (around 2/6/2023) for HTN follow up, BMP recheck. Pt was discussed with Dr. Hubmerto Cruz MD (attending physician). I have reviewed patient medical and social history and medications. I have reviewed pertinent labs results and other data. I have discussed the diagnosis with the patient and the intended plan as seen in the above orders. The patient has received an after-visit summary and questions were answered concerning future plans. I have discussed medication side effects and warnings with the patient as well.     Greta Ospina MD  PGY-1 Resident, [facility]  12/06/22

## 2022-12-06 NOTE — PROGRESS NOTES
Chief Complaint   Patient presents with    Follow-up     Visit Vitals  BP (!) 146/81 (BP 1 Location: Left upper arm, BP Patient Position: Sitting, BP Cuff Size: Large adult)   Pulse 88   Temp 98 °F (36.7 °C) (Temporal)   Resp 18   Ht 6' 3\" (1.905 m)   Wt 258 lb 4 oz (117.1 kg)   SpO2 96%   BMI 32.28 kg/m²     1. Have you been to the ER, urgent care clinic since your last visit? Hospitalized since your last visit? No    2. Have you seen or consulted any other health care providers outside of the [address] [place] [address] since your last visit? Include any pap smears or colon screening.  No

## 2023-01-03 ENCOUNTER — OFFICE VISIT (OUTPATIENT)
Dept: SLEEP MEDICINE | Age: 43
End: 2023-01-03

## 2023-01-03 DIAGNOSIS — G47.33 OSA (OBSTRUCTIVE SLEEP APNEA): Primary | ICD-10-CM

## 2023-01-03 NOTE — PROGRESS NOTES
217 [facility]. [place], [facility]  Tel.  [phone]  Fax. 100 [facility] 60  [place], [address] [facility]  Tel.  [phone]  Fax. [phone] [address] [facility]  Tel.  [phone]  Fax. [phone]       S>Adarsh Fuentes III is a 38 y.o. male seen today to receive a home sleep testing unit (HST). Patient was educated on proper hookup and operation of the HST. Instruction forms and documentation were reviewed and signed. The patient demonstrated good understanding of the HST.    O>    There were no vitals taken for this visit. A>  No diagnosis found. P>  General information regarding operations and maintenance of the device was provided. He was provided information on sleep apnea including coresponding risk factors and the importance of proper treatment. Follow-up appointment was made to return the HST. He will be contacted once the results have been reviewed. He was asked to contact our office for any problems regarding his home sleep test study.     [facility] 8141

## 2023-01-04 ENCOUNTER — TELEPHONE (OUTPATIENT)
Dept: SLEEP MEDICINE | Age: 43
End: 2023-01-04

## 2023-01-04 DIAGNOSIS — G47.33 OSA (OBSTRUCTIVE SLEEP APNEA): Primary | ICD-10-CM

## 2023-01-05 NOTE — TELEPHONE ENCOUNTER
Failed HSAT - Repeat testing.     Orders Placed This Encounter    SLEEP STUDY UNATTENDED, 4 CHANNEL     Order Specific Question:   Reason for Exam     Answer:   RADHA

## 2023-01-20 ENCOUNTER — TELEPHONE (OUTPATIENT)
Dept: SLEEP MEDICINE | Age: 43
End: 2023-01-20

## 2023-02-07 ENCOUNTER — HOSPITAL ENCOUNTER (EMERGENCY)
Age: 43
Discharge: LEFT BEFORE DISCHARGE | End: 2023-02-07
Payer: MEDICAID

## 2023-02-07 VITALS
BODY MASS INDEX: 33.57 KG/M2 | TEMPERATURE: 98.9 F | HEART RATE: 67 BPM | OXYGEN SATURATION: 96 % | SYSTOLIC BLOOD PRESSURE: 117 MMHG | DIASTOLIC BLOOD PRESSURE: 66 MMHG | HEIGHT: 75 IN | RESPIRATION RATE: 18 BRPM | WEIGHT: 270 LBS

## 2023-02-07 PROCEDURE — 75810000275 HC EMERGENCY DEPT VISIT NO LEVEL OF CARE

## 2023-08-04 ENCOUNTER — APPOINTMENT (OUTPATIENT)
Facility: HOSPITAL | Age: 43
End: 2023-08-04
Payer: MEDICAID

## 2023-08-04 ENCOUNTER — HOSPITAL ENCOUNTER (EMERGENCY)
Facility: HOSPITAL | Age: 43
Discharge: HOME | End: 2023-08-04
Attending: EMERGENCY MEDICINE
Payer: MEDICAID

## 2023-08-04 VITALS
OXYGEN SATURATION: 98 % | DIASTOLIC BLOOD PRESSURE: 93 MMHG | RESPIRATION RATE: 22 BRPM | TEMPERATURE: 97.7 F | SYSTOLIC BLOOD PRESSURE: 144 MMHG | HEART RATE: 97 BPM | HEIGHT: 75 IN | WEIGHT: 270 LBS | BODY MASS INDEX: 33.57 KG/M2

## 2023-08-04 DIAGNOSIS — R10.9 RIGHT FLANK PAIN: Primary | ICD-10-CM

## 2023-08-04 LAB
ALBUMIN SERPL-MCNC: 3.8 G/DL (ref 3.5–5)
ALBUMIN/GLOB SERPL: 1.1 (ref 1.1–2.2)
ALP SERPL-CCNC: 125 U/L (ref 45–117)
ALT SERPL-CCNC: 34 U/L (ref 12–78)
ANION GAP SERPL CALC-SCNC: 11 MMOL/L (ref 5–15)
AST SERPL W P-5'-P-CCNC: 22 U/L (ref 15–37)
BASOPHILS # BLD: 0 K/UL (ref 0–0.1)
BASOPHILS NFR BLD: 1 % (ref 0–1)
BILIRUB SERPL-MCNC: 0.5 MG/DL (ref 0.2–1)
BUN SERPL-MCNC: 16 MG/DL (ref 6–20)
BUN/CREAT SERPL: 13 (ref 12–20)
CA-I BLD-MCNC: 9.1 MG/DL (ref 8.5–10.1)
CHLORIDE SERPL-SCNC: 107 MMOL/L (ref 97–108)
CO2 SERPL-SCNC: 25 MMOL/L (ref 21–32)
CREAT SERPL-MCNC: 1.2 MG/DL (ref 0.7–1.3)
DIFFERENTIAL METHOD BLD: NORMAL
EOSINOPHIL # BLD: 0.1 K/UL (ref 0–0.4)
EOSINOPHIL NFR BLD: 2 % (ref 0–7)
ERYTHROCYTE [DISTWIDTH] IN BLOOD BY AUTOMATED COUNT: 12.1 % (ref 11.5–14.5)
GLOBULIN SER CALC-MCNC: 3.4 G/DL (ref 2–4)
GLUCOSE SERPL-MCNC: 103 MG/DL (ref 65–100)
HCT VFR BLD AUTO: 40.6 % (ref 36.6–50.3)
HGB BLD-MCNC: 13.9 G/DL (ref 12.1–17)
IMM GRANULOCYTES # BLD AUTO: 0 K/UL (ref 0–0.04)
IMM GRANULOCYTES NFR BLD AUTO: 0 % (ref 0–0.5)
LYMPHOCYTES # BLD: 1.9 K/UL (ref 0.8–3.5)
LYMPHOCYTES NFR BLD: 29 % (ref 12–49)
MCH RBC QN AUTO: 30 PG (ref 26–34)
MCHC RBC AUTO-ENTMCNC: 34.2 G/DL (ref 30–36.5)
MCV RBC AUTO: 87.7 FL (ref 80–99)
MONOCYTES # BLD: 0.5 K/UL (ref 0–1)
MONOCYTES NFR BLD: 7 % (ref 5–13)
NEUTS SEG # BLD: 4 K/UL (ref 1.8–8)
NEUTS SEG NFR BLD: 61 % (ref 32–75)
PLATELET # BLD AUTO: 250 K/UL (ref 150–400)
PMV BLD AUTO: 9.4 FL (ref 8.9–12.9)
POTASSIUM SERPL-SCNC: 3.9 MMOL/L (ref 3.5–5.1)
PROT SERPL-MCNC: 7.2 G/DL (ref 6.4–8.2)
RBC # BLD AUTO: 4.63 M/UL (ref 4.1–5.7)
SODIUM SERPL-SCNC: 143 MMOL/L (ref 136–145)
WBC # BLD AUTO: 6.5 K/UL (ref 4.1–11.1)

## 2023-08-04 PROCEDURE — 96374 THER/PROPH/DIAG INJ IV PUSH: CPT

## 2023-08-04 PROCEDURE — 85025 COMPLETE CBC W/AUTO DIFF WBC: CPT

## 2023-08-04 PROCEDURE — 6360000002 HC RX W HCPCS: Performed by: EMERGENCY MEDICINE

## 2023-08-04 PROCEDURE — 80053 COMPREHEN METABOLIC PANEL: CPT

## 2023-08-04 PROCEDURE — 96375 TX/PRO/DX INJ NEW DRUG ADDON: CPT

## 2023-08-04 PROCEDURE — 36415 COLL VENOUS BLD VENIPUNCTURE: CPT

## 2023-08-04 PROCEDURE — 2580000003 HC RX 258: Performed by: EMERGENCY MEDICINE

## 2023-08-04 PROCEDURE — 74176 CT ABD & PELVIS W/O CONTRAST: CPT

## 2023-08-04 PROCEDURE — 96361 HYDRATE IV INFUSION ADD-ON: CPT

## 2023-08-04 PROCEDURE — 99284 EMERGENCY DEPT VISIT MOD MDM: CPT

## 2023-08-04 RX ORDER — 0.9 % SODIUM CHLORIDE 0.9 %
1000 INTRAVENOUS SOLUTION INTRAVENOUS
Status: COMPLETED | OUTPATIENT
Start: 2023-08-04 | End: 2023-08-04

## 2023-08-04 RX ORDER — MORPHINE SULFATE 4 MG/ML
4 INJECTION INTRAVENOUS ONCE
Status: COMPLETED | OUTPATIENT
Start: 2023-08-04 | End: 2023-08-04

## 2023-08-04 RX ORDER — KETOROLAC TROMETHAMINE 30 MG/ML
30 INJECTION, SOLUTION INTRAMUSCULAR; INTRAVENOUS
Status: COMPLETED | OUTPATIENT
Start: 2023-08-04 | End: 2023-08-04

## 2023-08-04 RX ORDER — ONDANSETRON 2 MG/ML
4 INJECTION INTRAMUSCULAR; INTRAVENOUS ONCE
Status: COMPLETED | OUTPATIENT
Start: 2023-08-04 | End: 2023-08-04

## 2023-08-04 RX ORDER — IBUPROFEN 800 MG/1
800 TABLET ORAL
Qty: 20 TABLET | Refills: 0 | Status: SHIPPED | OUTPATIENT
Start: 2023-08-04

## 2023-08-04 RX ADMIN — MORPHINE SULFATE 4 MG: 4 INJECTION, SOLUTION INTRAMUSCULAR; INTRAVENOUS at 00:54

## 2023-08-04 RX ADMIN — ONDANSETRON 4 MG: 2 INJECTION INTRAMUSCULAR; INTRAVENOUS at 00:54

## 2023-08-04 RX ADMIN — KETOROLAC TROMETHAMINE 30 MG: 30 INJECTION, SOLUTION INTRAMUSCULAR; INTRAVENOUS at 00:56

## 2023-08-04 RX ADMIN — SODIUM CHLORIDE 1000 ML: 9 INJECTION, SOLUTION INTRAVENOUS at 00:53

## 2023-08-04 ASSESSMENT — PAIN SCALES - GENERAL: PAINLEVEL_OUTOF10: 10

## 2023-08-04 ASSESSMENT — LIFESTYLE VARIABLES
HOW MANY STANDARD DRINKS CONTAINING ALCOHOL DO YOU HAVE ON A TYPICAL DAY: PATIENT DOES NOT DRINK
HOW OFTEN DO YOU HAVE A DRINK CONTAINING ALCOHOL: NEVER

## 2023-08-04 ASSESSMENT — PAIN DESCRIPTION - LOCATION: LOCATION: FLANK

## 2023-08-04 ASSESSMENT — PAIN - FUNCTIONAL ASSESSMENT: PAIN_FUNCTIONAL_ASSESSMENT: 0-10

## 2023-08-04 ASSESSMENT — PAIN DESCRIPTION - ORIENTATION: ORIENTATION: RIGHT

## 2023-08-04 NOTE — ED PROVIDER NOTES
oropharyngeal exudate or posterior oropharyngeal erythema. Eyes:      General: No scleral icterus. Conjunctiva/sclera: Conjunctivae normal.   Cardiovascular:      Rate and Rhythm: Normal rate and regular rhythm. Heart sounds: Normal heart sounds. Pulmonary:      Effort: Pulmonary effort is normal.      Breath sounds: Normal breath sounds. Abdominal:      Palpations: Abdomen is soft. Tenderness: There is no abdominal tenderness. There is no right CVA tenderness, left CVA tenderness, guarding or rebound. Musculoskeletal:      Cervical back: Neck supple. Right lower leg: No edema. Left lower leg: No edema. Comments: Lumbar spasm on movement. Thoracic and lumbar midline nontender. Skin:     General: Skin is warm and dry. Neurological:      General: No focal deficit present. Mental Status: He is alert.    Psychiatric:         Behavior: Behavior normal.       Diagnostic Study Results     Labs -     Recent Results (from the past 12 hour(s))   CBC with Auto Differential    Collection Time: 08/04/23 12:45 AM   Result Value Ref Range    WBC 6.5 4.1 - 11.1 K/uL    RBC 4.63 4.10 - 5.70 M/uL    Hemoglobin 13.9 12.1 - 17.0 g/dL    Hematocrit 40.6 36.6 - 50.3 %    MCV 87.7 80.0 - 99.0 FL    MCH 30.0 26.0 - 34.0 PG    MCHC 34.2 30.0 - 36.5 g/dL    RDW 12.1 11.5 - 14.5 %    Platelets 967 765 - 981 K/uL    MPV 9.4 8.9 - 12.9 FL    Neutrophils % 61 32 - 75 %    Lymphocytes % 29 12 - 49 %    Monocytes % 7 5 - 13 %    Eosinophils % 2 0 - 7 %    Basophils % 1 0 - 1 %    Immature Granulocytes 0 0.0 - 0.5 %    Neutrophils Absolute 4.0 1.8 - 8.0 K/UL    Lymphocytes Absolute 1.9 0.8 - 3.5 K/UL    Monocytes Absolute 0.5 0.0 - 1.0 K/UL    Eosinophils Absolute 0.1 0.0 - 0.4 K/UL    Basophils Absolute 0.0 0.0 - 0.1 K/UL    Absolute Immature Granulocyte 0.0 0.00 - 0.04 K/UL    Differential Type AUTOMATED     CMP    Collection Time: 08/04/23 12:45 AM   Result Value Ref Range    Sodium 143 136 - 145

## 2023-08-28 ENCOUNTER — HOSPITAL ENCOUNTER (EMERGENCY)
Facility: HOSPITAL | Age: 43
Discharge: HOME | End: 2023-08-28
Payer: MEDICAID

## 2023-08-28 ENCOUNTER — APPOINTMENT (OUTPATIENT)
Facility: HOSPITAL | Age: 43
End: 2023-08-28
Payer: MEDICAID

## 2023-08-28 VITALS
TEMPERATURE: 98.7 F | DIASTOLIC BLOOD PRESSURE: 65 MMHG | SYSTOLIC BLOOD PRESSURE: 134 MMHG | OXYGEN SATURATION: 100 % | HEART RATE: 96 BPM | WEIGHT: 240 LBS | BODY MASS INDEX: 29.84 KG/M2 | HEIGHT: 75 IN | RESPIRATION RATE: 18 BRPM

## 2023-08-28 DIAGNOSIS — J06.9 VIRAL URI WITH COUGH: Primary | ICD-10-CM

## 2023-08-28 PROCEDURE — 87880 STREP A ASSAY W/OPTIC: CPT

## 2023-08-28 PROCEDURE — 87070 CULTURE OTHR SPECIMN AEROBIC: CPT

## 2023-08-28 PROCEDURE — 99284 EMERGENCY DEPT VISIT MOD MDM: CPT

## 2023-08-28 PROCEDURE — 87804 INFLUENZA ASSAY W/OPTIC: CPT

## 2023-08-28 PROCEDURE — 87635 SARS-COV-2 COVID-19 AMP PRB: CPT

## 2023-08-28 PROCEDURE — 71045 X-RAY EXAM CHEST 1 VIEW: CPT

## 2023-08-28 PROCEDURE — 87147 CULTURE TYPE IMMUNOLOGIC: CPT

## 2023-08-28 RX ORDER — ALBUTEROL SULFATE 90 UG/1
2 AEROSOL, METERED RESPIRATORY (INHALATION) EVERY 6 HOURS PRN
Qty: 18 G | Refills: 1 | Status: SHIPPED | OUTPATIENT
Start: 2023-08-28

## 2023-08-28 RX ORDER — GUAIFENESIN 600 MG/1
600 TABLET, EXTENDED RELEASE ORAL 2 TIMES DAILY
Qty: 30 TABLET | Refills: 0 | Status: SHIPPED | OUTPATIENT
Start: 2023-08-28 | End: 2023-09-12

## 2023-08-28 ASSESSMENT — PAIN SCALES - GENERAL: PAINLEVEL_OUTOF10: 9

## 2023-08-28 ASSESSMENT — PAIN DESCRIPTION - LOCATION: LOCATION: HEAD;OTHER (COMMENT)

## 2023-08-28 ASSESSMENT — PAIN DESCRIPTION - PAIN TYPE: TYPE: ACUTE PAIN

## 2023-08-28 ASSESSMENT — PAIN - FUNCTIONAL ASSESSMENT: PAIN_FUNCTIONAL_ASSESSMENT: 0-10

## 2023-08-28 ASSESSMENT — PAIN DESCRIPTION - DESCRIPTORS: DESCRIPTORS: ACHING

## 2023-08-28 NOTE — ED PROVIDER NOTES
[facility] EMERGENCY DEPT  EMERGENCY DEPARTMENT HISTORY AND PHYSICAL EXAM      Date: 2023  Patient Name: Mortimer Noss  MRN: [id number]  [address] [facility] 1980  Date of evaluation: 2023  Provider: Irlanda Alonzo PA-C   Note Started: 6:26 PM EDT 23    HISTORY OF PRESENT ILLNESS     Chief Complaint   Patient presents with    Nasal Congestion    Cough    Headache       History Provided By: Patient    HPI: Mortimer Noss is a 38 y.o. male past medical history as below presents emerged from today with complaint of cough congestion starting 1 day ago. Reports waking up with symptoms this morning. Also reports associated symptoms including chills. Denies any known sick exposure. Breathing is no different from baseline consistent to asthma. Denies any fever, chest pain, abdominal pain, vomiting, diarrhea or known sick exposure at this time. PAST MEDICAL HISTORY   Past Medical History:  Past Medical History:   Diagnosis Date    Arthritis     Asthma     Bone disease     Calculus of kidney        Past Surgical History:  Past Surgical History:   Procedure Laterality Date    HEENT      wisdom teeth    OTHER SURGICAL HISTORY      procedure on wrist        Family History:  Family History   Problem Relation Age of Onset    Diabetes Father        Social History:  Social History     Tobacco Use    Smoking status: Former     Types: Cigarettes     Quit date: 2006     Years since quittin.2    Smokeless tobacco: Never   Substance Use Topics    Alcohol use: Yes    Drug use: Never       Allergies:  No Known Allergies    PCP: Darius Gomez MD    Current Meds:   No current facility-administered medications for this encounter.      Current Outpatient Medications   Medication Sig Dispense Refill    guaiFENesin (MUCINEX) 600 MG extended release tablet Take 1 tablet by mouth 2 times daily for 15 days 30 tablet 0    albuterol sulfate HFA (PROVENTIL;VENTOLIN;PROAIR) 108 (90 Base) MCG/ACT inhaler Inhale 2 puffs

## 2023-08-29 LAB
SARS-COV-2 RNA RESP QL NAA+PROBE: DETECTED
SOURCE: ABNORMAL

## 2023-08-31 LAB
BACTERIA SPEC CULT: ABNORMAL
BACTERIA SPEC CULT: ABNORMAL
Lab: ABNORMAL

## 2023-09-03 RX ORDER — GUAIFENESIN/DEXTROMETHORPHAN 100-10MG/5
5 SYRUP ORAL 3 TIMES DAILY PRN
Qty: 120 ML | Refills: 0 | Status: SHIPPED | OUTPATIENT
Start: 2023-09-03 | End: 2023-09-13

## 2023-09-03 RX ORDER — AMOXICILLIN 500 MG/1
500 CAPSULE ORAL 2 TIMES DAILY
Qty: 20 CAPSULE | Refills: 0 | Status: SHIPPED | OUTPATIENT
Start: 2023-09-03 | End: 2023-09-13

## 2023-10-21 ENCOUNTER — HOSPITAL ENCOUNTER (EMERGENCY)
Facility: HOSPITAL | Age: 43
Discharge: HOME | End: 2023-10-21
Attending: EMERGENCY MEDICINE
Payer: MEDICAID

## 2023-10-21 VITALS
OXYGEN SATURATION: 98 % | RESPIRATION RATE: 18 BRPM | WEIGHT: 240 LBS | HEART RATE: 78 BPM | BODY MASS INDEX: 29.84 KG/M2 | DIASTOLIC BLOOD PRESSURE: 93 MMHG | SYSTOLIC BLOOD PRESSURE: 139 MMHG | HEIGHT: 75 IN | TEMPERATURE: 97.7 F

## 2023-10-21 DIAGNOSIS — M79.672 PAIN IN BOTH FEET: Primary | ICD-10-CM
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
## 2023-10-21 DIAGNOSIS — M79.671 PAIN IN BOTH FEET: Primary | ICD-10-CM

## 2023-10-21 DIAGNOSIS — L84 CALLUS OF FOOT: ICD-10-CM

## 2023-10-21 PROCEDURE — 99283 EMERGENCY DEPT VISIT LOW MDM: CPT

## 2023-10-21 PROCEDURE — 6370000000 HC RX 637 (ALT 250 FOR IP): Performed by: EMERGENCY MEDICINE

## 2023-10-21 RX ORDER — PERMETHRIN 50 MG/G
CREAM TOPICAL
Qty: 60 G | Refills: 1 | Status: SHIPPED | OUTPATIENT
Start: 2023-10-21

## 2023-10-21 RX ORDER — IBUPROFEN 800 MG/1
800 TABLET ORAL
Qty: 20 TABLET | Refills: 0 | Status: SHIPPED | OUTPATIENT
Start: 2023-10-21

## 2023-10-21 RX ORDER — IBUPROFEN 800 MG/1
800 TABLET ORAL
Status: COMPLETED | OUTPATIENT
Start: 2023-10-21 | End: 2023-10-21

## 2023-10-21 RX ORDER — ACETAMINOPHEN 500 MG
1000 TABLET ORAL
Status: COMPLETED | OUTPATIENT
Start: 2023-10-21 | End: 2023-10-21

## 2023-10-21 RX ADMIN — IBUPROFEN 800 MG: 800 TABLET, FILM COATED ORAL at 04:01

## 2023-10-21 RX ADMIN — ACETAMINOPHEN 1000 MG: 500 TABLET ORAL at 04:01

## 2023-10-21 ASSESSMENT — PAIN SCALES - GENERAL: PAINLEVEL_OUTOF10: 7

## 2023-10-21 ASSESSMENT — PAIN - FUNCTIONAL ASSESSMENT: PAIN_FUNCTIONAL_ASSESSMENT: 0-10

## 2023-10-21 NOTE — ED PROVIDER NOTES
800 MG tablet  Commonly known as: ADVIL;MOTRIN  Take 1 tablet by mouth 3 times daily (with meals)  What changed: Another medication with the same name was added. Make sure you understand how and when to take each. * ibuprofen 800 MG tablet  Commonly known as: ADVIL;MOTRIN  Take 1 tablet by mouth 3 times daily (with meals)  What changed: You were already taking a medication with the same name, and this prescription was added. Make sure you understand how and when to take each. * This list has 2 medication(s) that are the same as other medications prescribed for you. Read the directions carefully, and ask your doctor or other care provider to review them with you. ASK your doctor about these medications      albuterol sulfate  (90 Base) MCG/ACT inhaler  Commonly known as: PROVENTIL;VENTOLIN;PROAIR  Inhale 2 puffs into the lungs every 6 hours as needed for Wheezing or Shortness of Breath     losartan 25 MG tablet  Commonly known as: COZAAR               Where to Get Your Medications        These medications were sent to [facility]/pharmacy [address] [place] [address] [phone] [address] [address]      Phone: [phone]   ibuprofen 800 MG tablet  permethrin 5 % cream       2. [unfilled]  Return to ED if worse     Diagnosis     Clinical Impression:   1. Pain in both feet    2. Callus of foot    3. Bug bite, initial encounter        Please note that this dictation was completed with [employer], the computer voice recognition software. Quite often unanticipated grammatical, syntax, homophones, and other interpretive errors are inadvertently transcribed by the computer software. Please disregard these errors. Please excuse any errors that have escaped final proofreading. Thank you.        Ilia White MD  10/21/23 4151

## 2023-10-21 NOTE — ED TRIAGE NOTES
Bilateral feet pain that has been going on since x1 week near first toes.  Has been happening since he got new boots

## 2023-12-01 ENCOUNTER — TELEPHONE (OUTPATIENT)
Age: 43
End: 2023-12-01

## 2023-12-01 NOTE — TELEPHONE ENCOUNTER
not set up, unable to leave message. ----- Message from Erasmo Milner DPM sent at 10/23/2023  8:09 AM EDT -----  This patient was recently seen in the emergency department and her chart was copied to me. Can we please reach out and see if an appointment is needed in the office?     Thank you!    ----- Message -----  From: Boby Garcia MD  Sent: 10/21/2023   4:04 AM EDT  To: Erasmo Milner DPM

## 2024-02-09 ENCOUNTER — HOSPITAL ENCOUNTER (EMERGENCY)
Facility: HOSPITAL | Age: 44
Discharge: HOME | End: 2024-02-09
Attending: EMERGENCY MEDICINE
Payer: MEDICAID

## 2024-02-09 ENCOUNTER — APPOINTMENT (OUTPATIENT)
Facility: HOSPITAL | Age: 44
End: 2024-02-09
Payer: MEDICAID

## 2024-02-09 VITALS
SYSTOLIC BLOOD PRESSURE: 130 MMHG | OXYGEN SATURATION: 98 % | DIASTOLIC BLOOD PRESSURE: 78 MMHG | TEMPERATURE: 98.5 F | BODY MASS INDEX: 29.84 KG/M2 | RESPIRATION RATE: 16 BRPM | HEIGHT: 75 IN | WEIGHT: 240 LBS | HEART RATE: 80 BPM

## 2024-02-09 DIAGNOSIS — M70.51 BURSITIS OF RIGHT KNEE, UNSPECIFIED BURSA: ICD-10-CM

## 2024-02-09 DIAGNOSIS — M25.561 ACUTE PAIN OF RIGHT KNEE: Primary | ICD-10-CM

## 2024-02-09 PROCEDURE — 96372 THER/PROPH/DIAG INJ SC/IM: CPT

## 2024-02-09 PROCEDURE — 6360000002 HC RX W HCPCS: Performed by: EMERGENCY MEDICINE

## 2024-02-09 PROCEDURE — 73562 X-RAY EXAM OF KNEE 3: CPT

## 2024-02-09 PROCEDURE — 99284 EMERGENCY DEPT VISIT MOD MDM: CPT

## 2024-02-09 PROCEDURE — 6370000000 HC RX 637 (ALT 250 FOR IP): Performed by: EMERGENCY MEDICINE

## 2024-02-09 RX ORDER — IBUPROFEN 800 MG/1
800 TABLET ORAL
Qty: 20 TABLET | Refills: 0 | Status: SHIPPED | OUTPATIENT
Start: 2024-02-09

## 2024-02-09 RX ORDER — ACETAMINOPHEN 500 MG
1000 TABLET ORAL EVERY 8 HOURS PRN
Qty: 360 TABLET | Refills: 1 | Status: SHIPPED | OUTPATIENT
Start: 2024-02-09

## 2024-02-09 RX ORDER — PREDNISONE 20 MG/1
TABLET ORAL
Qty: 12 TABLET | Refills: 1 | Status: SHIPPED | OUTPATIENT
Start: 2024-02-09

## 2024-02-09 RX ORDER — DEXAMETHASONE SODIUM PHOSPHATE 10 MG/ML
10 INJECTION, SOLUTION INTRAMUSCULAR; INTRAVENOUS ONCE
Status: COMPLETED | OUTPATIENT
Start: 2024-02-09 | End: 2024-02-09

## 2024-02-09 RX ORDER — KETOROLAC TROMETHAMINE 30 MG/ML
60 INJECTION, SOLUTION INTRAMUSCULAR; INTRAVENOUS
Status: DISCONTINUED | OUTPATIENT
Start: 2024-02-09 | End: 2024-02-09 | Stop reason: HOSPADM

## 2024-02-09 RX ORDER — ACETAMINOPHEN 500 MG
1000 TABLET ORAL
Status: COMPLETED | OUTPATIENT
Start: 2024-02-09 | End: 2024-02-09

## 2024-02-09 RX ADMIN — ACETAMINOPHEN 1000 MG: 500 TABLET ORAL at 20:07

## 2024-02-09 RX ADMIN — DEXAMETHASONE SODIUM PHOSPHATE 10 MG: 10 INJECTION, SOLUTION INTRAMUSCULAR; INTRAVENOUS at 20:07

## 2024-02-09 ASSESSMENT — PAIN - FUNCTIONAL ASSESSMENT: PAIN_FUNCTIONAL_ASSESSMENT: 0-10

## 2024-02-09 ASSESSMENT — PAIN SCALES - GENERAL: PAINLEVEL_OUTOF10: 8

## 2024-02-09 NOTE — ED TRIAGE NOTES
Pt states that he woke up yesterday and his right knee is swollen and very painful.  He states that the right knee cap feels like it is going up and the rest of his knee feels like it is going down.  Also, states that behind the knee feels like something is pulled to beyond the limit

## 2024-02-10 NOTE — ED PROVIDER NOTES
EMERGENCY DEPARTMENT HISTORY AND PHYSICAL EXAM    Date: 2/9/2024  Patient Name: Tyree Chase III    History of Presenting Illness     Chief Complaint   Patient presents with    Knee Pain       History Provided By: Patient    HPI: Tyree Chase III, 43 y.o. male   presents to the ED with cc of right knee pain.  Patient complained of pain that began yesterday morning.  Pain has been constant and moderate to severe degree with ambulation and movement of the knee aggravated the pain.  Patient denies obvious injury however relates a history of working on his knees at work without kneepads for last several days.  No fever or chills.  No paresthesia or motor dysfunction.  No other joint pain.  No OTC treatment      PCP: No primary care provider on file.    No current facility-administered medications on file prior to encounter.     Current Outpatient Medications on File Prior to Encounter   Medication Sig Dispense Refill    ibuprofen (ADVIL;MOTRIN) 800 MG tablet Take 1 tablet by mouth 3 times daily (with meals) (Patient not taking: Reported on 2/9/2024) 20 tablet 0    permethrin (ELIMITE) 5 % cream Apply topically as directed (Patient not taking: Reported on 2/9/2024) 60 g 1    albuterol sulfate HFA (PROVENTIL;VENTOLIN;PROAIR) 108 (90 Base) MCG/ACT inhaler Inhale 2 puffs into the lungs every 6 hours as needed for Wheezing or Shortness of Breath (Patient not taking: Reported on 2/9/2024) 18 g 1    ibuprofen (ADVIL;MOTRIN) 800 MG tablet Take 1 tablet by mouth 3 times daily (with meals) (Patient not taking: Reported on 2/9/2024) 20 tablet 0    losartan (COZAAR) 25 MG tablet Take 50 mg by mouth daily (Patient not taking: Reported on 2/9/2024)         Past History     Past Medical History:  Past Medical History:   Diagnosis Date    Arthritis     Asthma     Bone disease     Calculus of kidney        Past Surgical History:  Past Surgical History:   Procedure Laterality Date    HEENT      wisdom teeth    OTHER SURGICAL HISTORY

## 2024-02-19 ENCOUNTER — TELEPHONE (OUTPATIENT)
Age: 44
End: 2024-02-19

## 2024-02-20 ENCOUNTER — OFFICE VISIT (OUTPATIENT)
Age: 44
End: 2024-02-20

## 2024-02-20 VITALS — HEIGHT: 75 IN | WEIGHT: 240 LBS | BODY MASS INDEX: 29.84 KG/M2

## 2024-02-20 DIAGNOSIS — M70.41 PREPATELLAR BURSITIS OF RIGHT KNEE: ICD-10-CM

## 2024-02-20 DIAGNOSIS — M25.461 EFFUSION OF RIGHT KNEE: ICD-10-CM

## 2024-02-20 DIAGNOSIS — M17.11 OSTEOARTHRITIS OF RIGHT PATELLOFEMORAL JOINT: Primary | ICD-10-CM

## 2024-02-20 RX ORDER — TRIAMCINOLONE ACETONIDE 40 MG/ML
40 INJECTION, SUSPENSION INTRA-ARTICULAR; INTRAMUSCULAR ONCE
Status: COMPLETED | OUTPATIENT
Start: 2024-02-20 | End: 2024-02-20

## 2024-02-20 RX ADMIN — TRIAMCINOLONE ACETONIDE 40 MG: 40 INJECTION, SUSPENSION INTRA-ARTICULAR; INTRAMUSCULAR at 11:00

## 2024-02-20 ASSESSMENT — PATIENT HEALTH QUESTIONNAIRE - PHQ9
SUM OF ALL RESPONSES TO PHQ QUESTIONS 1-9: 0
2. FEELING DOWN, DEPRESSED OR HOPELESS: 0
SUM OF ALL RESPONSES TO PHQ QUESTIONS 1-9: 0
SUM OF ALL RESPONSES TO PHQ9 QUESTIONS 1 & 2: 0
SUM OF ALL RESPONSES TO PHQ QUESTIONS 1-9: 0
1. LITTLE INTEREST OR PLEASURE IN DOING THINGS: 0
SUM OF ALL RESPONSES TO PHQ QUESTIONS 1-9: 0

## 2024-02-20 NOTE — PROGRESS NOTES
Identified pt with two pt identifiers (name and ). Reviewed chart in preparation for visit and have obtained necessary documentation.    Tyree Chase III is a 43 y.o. male  Chief Complaint   Patient presents with    New Patient     R knee pain      Ht 1.905 m (6' 3\")   Wt 108.9 kg (240 lb)   BMI 30.00 kg/m²     1. Have you been to the ER, urgent care clinic since your last visit?  Hospitalized since your last visit?no    2. Have you seen or consulted any other health care providers outside of the [facility] since your last visit?  Include any pap smears or colon screening. no    
patellar tendon and patella.    Range of Motion   The patient has normal right knee ROM.    Tests   Nadine:  Medial - negative Lateral - negative  Varus: negative Valgus: negative  Lachman:  Anterior - negative      Patellar apprehension: positive    Other   Erythema: absent  Scars: absent  Sensation: normal  Pulse: present  Swelling: moderate  Effusion: effusion present              Assessment:     1. Osteoarthritis of right patellofemoral joint    2. Prepatellar bursitis of right knee    3. Effusion of right knee      Symptoms slightly improved after oral prednisone.    Plan:     I recommended a steroid (Kenalog) injection into the right knee and the patient is agreeable. The risks and benefits were outlined, including a cortisone \"flare reaction\".     After verbal consent was obtained, I marked the right knee as the injection site. I prepped the right lateral knee with Betadine, Chlorhexidine, and alcohol, and inected 4ml of 1% lidocaine followed by a mixture of 1ml Kenalog and 5ml of 0.5% Marcaine. No complication and no bleeding. Bandaid placed.    Recommend 2 Aleve 2x per day, and heat on the knee. A new hinged brace was provided since the other one was broken.     F/U as needed.

## 2024-02-23 DIAGNOSIS — M17.11 OSTEOARTHRITIS OF RIGHT PATELLOFEMORAL JOINT: Primary | ICD-10-CM

## 2024-05-10 ENCOUNTER — HOSPITAL ENCOUNTER (EMERGENCY)
Facility: HOSPITAL | Age: 44
Discharge: HOME | End: 2024-05-10
Attending: EMERGENCY MEDICINE
Payer: MEDICAID

## 2024-05-10 VITALS
RESPIRATION RATE: 18 BRPM | SYSTOLIC BLOOD PRESSURE: 161 MMHG | OXYGEN SATURATION: 98 % | HEART RATE: 68 BPM | DIASTOLIC BLOOD PRESSURE: 84 MMHG | WEIGHT: 250 LBS | TEMPERATURE: 98.7 F | HEIGHT: 75 IN | BODY MASS INDEX: 31.08 KG/M2

## 2024-05-10 DIAGNOSIS — J40 BRONCHITIS: Primary | ICD-10-CM

## 2024-05-10 PROCEDURE — 6370000000 HC RX 637 (ALT 250 FOR IP): Performed by: EMERGENCY MEDICINE

## 2024-05-10 PROCEDURE — 99283 EMERGENCY DEPT VISIT LOW MDM: CPT

## 2024-05-10 PROCEDURE — 6360000002 HC RX W HCPCS: Performed by: EMERGENCY MEDICINE

## 2024-05-10 RX ORDER — AZITHROMYCIN 250 MG/1
TABLET, FILM COATED ORAL
Qty: 1 PACKET | Refills: 0 | Status: SHIPPED | OUTPATIENT
Start: 2024-05-10

## 2024-05-10 RX ORDER — GUAIFENESIN 600 MG/1
1200 TABLET, EXTENDED RELEASE ORAL 2 TIMES DAILY
Qty: 30 TABLET | Refills: 0 | Status: SHIPPED | OUTPATIENT
Start: 2024-05-10 | End: 2024-05-25

## 2024-05-10 RX ORDER — ALBUTEROL SULFATE 90 UG/1
2 AEROSOL, METERED RESPIRATORY (INHALATION) 4 TIMES DAILY PRN
Qty: 54 G | Refills: 1 | Status: SHIPPED | OUTPATIENT
Start: 2024-05-10

## 2024-05-10 RX ORDER — ACETAMINOPHEN 500 MG
1000 TABLET ORAL
Status: COMPLETED | OUTPATIENT
Start: 2024-05-10 | End: 2024-05-10

## 2024-05-10 RX ORDER — DEXAMETHASONE SODIUM PHOSPHATE 10 MG/ML
10 INJECTION, SOLUTION INTRAMUSCULAR; INTRAVENOUS ONCE
Status: DISCONTINUED | OUTPATIENT
Start: 2024-05-10 | End: 2024-05-10

## 2024-05-10 RX ORDER — IPRATROPIUM BROMIDE AND ALBUTEROL SULFATE 2.5; .5 MG/3ML; MG/3ML
1 SOLUTION RESPIRATORY (INHALATION)
Status: COMPLETED | OUTPATIENT
Start: 2024-05-10 | End: 2024-05-10

## 2024-05-10 RX ORDER — IBUPROFEN 400 MG/1
400 TABLET ORAL
Status: COMPLETED | OUTPATIENT
Start: 2024-05-10 | End: 2024-05-10

## 2024-05-10 RX ORDER — LORATADINE 10 MG/1
10 TABLET ORAL DAILY
Qty: 30 TABLET | Refills: 0 | Status: SHIPPED | OUTPATIENT
Start: 2024-05-10 | End: 2024-06-09

## 2024-05-10 RX ORDER — PREDNISONE 20 MG/1
TABLET ORAL
Qty: 12 TABLET | Refills: 1 | Status: SHIPPED | OUTPATIENT
Start: 2024-05-10

## 2024-05-10 RX ORDER — PREDNISONE 20 MG/1
60 TABLET ORAL
Status: COMPLETED | OUTPATIENT
Start: 2024-05-10 | End: 2024-05-10

## 2024-05-10 RX ADMIN — ACETAMINOPHEN 1000 MG: 500 TABLET ORAL at 03:58

## 2024-05-10 RX ADMIN — IBUPROFEN 400 MG: 400 TABLET, FILM COATED ORAL at 03:58

## 2024-05-10 RX ADMIN — IPRATROPIUM BROMIDE AND ALBUTEROL SULFATE 1 DOSE: .5; 3 SOLUTION RESPIRATORY (INHALATION) at 03:58

## 2024-05-10 RX ADMIN — PREDNISONE 60 MG: 20 TABLET ORAL at 03:58

## 2024-05-10 ASSESSMENT — PAIN SCALES - GENERAL: PAINLEVEL_OUTOF10: 10

## 2024-05-10 ASSESSMENT — PAIN - FUNCTIONAL ASSESSMENT: PAIN_FUNCTIONAL_ASSESSMENT: 0-10

## 2024-05-10 ASSESSMENT — LIFESTYLE VARIABLES
HOW OFTEN DO YOU HAVE A DRINK CONTAINING ALCOHOL: NEVER
HOW MANY STANDARD DRINKS CONTAINING ALCOHOL DO YOU HAVE ON A TYPICAL DAY: PATIENT DOES NOT DRINK

## 2024-05-10 NOTE — ED PROVIDER NOTES
[facility] EMERGENCY DEPT  EMERGENCY DEPARTMENT HISTORY AND PHYSICAL EXAM      Date: 5/10/2024  Patient Name: Tyree Chase III  MRN: [id number]  Birthdate 1980  Date of evaluation: 5/10/2024  Provider: Martina Duran MD  Note Started: 4:39 AM EDT 5/10/24    HISTORY OF PRESENT ILLNESS     Chief Complaint   Patient presents with    Cough    Generalized Body Aches       History Provided By: Patient    HPI: Tyree Chase III is a 43 y.o. male.  Patient presents with intermittent episode of paroxysmal nonproductive cough since yesterday.  Patient also complains generalized body ache with subjective fever without chills.  No chest pain or shortness of breath.  No vomiting or diarrhea.  No headache.  Patient has a history of asthma and states that his inhaler ran out.  Patient denies use of cigarettes        PAST MEDICAL HISTORY   Past Medical History:  Past Medical History:   Diagnosis Date    Arthritis     Asthma     Bone disease        Past Surgical History:  Past Surgical History:   Procedure Laterality Date    HEENT      wisdom teeth    OTHER SURGICAL HISTORY      procedure on wrist        Family History:  Family History   Problem Relation Age of Onset    Diabetes Father        Social History:  Social History     Tobacco Use    Smoking status: Former     Current packs/day: 0.00     Types: Cigarettes     Quit date: 2006     Years since quittin.9    Smokeless tobacco: Never   Substance Use Topics    Alcohol use: Yes     Comment: beer occasionally    Drug use: Never       Allergies:  Allergies   Allergen Reactions    Fish-Derived Products        PCP: No primary care provider on file.    Current Meds:   No current facility-administered medications for this encounter.     Current Outpatient Medications   Medication Sig Dispense Refill    guaiFENesin (MUCINEX) 600 MG extended release tablet Take 2 tablets by mouth 2 times daily for 15 days 30 tablet 0    predniSONE (DELTASONE) 20 MG tablet Take 3 tablets    Psychiatric:         Behavior: Behavior normal.           SCREENINGS                   LAB, EKG AND DIAGNOSTIC RESULTS   Labs:  No results found for this or any previous visit (from the past 12 hour(s)).        ED COURSE and DIFFERENTIAL DIAGNOSIS/MDM   4:42 AM Differential and Considerations:     Differential diagnosis that were considered include: URI/bronchitis/asthma exacerbation    Patient presented with 2-day history of proximal nonproductive cough with few scattered audible wheezing on forced exhalation.  O2 saturation normal at 98%.  Afebrile.  Alert nontoxic-appearing.  Well-hydrated.  No respiratory distress without retraction or accessory muscle use.  No clinical concern for sepsis or pneumonia.  DuoNeb/prednisone given with improvement of the symptoms.  Patient is discharged improved and stable condition with a course of prednisone/Mucinex/albuterol inhaler as needed.  Patient was given prescription for Zithromax with instruction not to fill the medication unless the coughing become persistent productive with fever.  Return cautions given            Records Reviewed (source and summary of external notes): Prior medical records and Nursing notes.    Vitals:    Vitals:    05/10/24 0332   BP: (!) 161/84   Pulse: 68   Resp: 18   Temp: 98.7 °F (37.1 °C)   TempSrc: Oral   SpO2: 98%   Weight: 113.4 kg (250 lb)   Height: 1.905 m (6' 3\")        ED COURSE     Feeling better.  Lung sounds clear to auscultation.  No respiratory distress      Patient was given the following medications:  Medications   ipratropium 0.5 mg-albuterol 2.5 mg (DUONEB) nebulizer solution 1 Dose (1 Dose Nebulization Given 5/10/24 0358)   predniSONE (DELTASONE) tablet 60 mg (60 mg Oral Given 5/10/24 0358)   acetaminophen (TYLENOL) tablet 1,000 mg (1,000 mg Oral Given 5/10/24 0358)   ibuprofen (ADVIL;MOTRIN) tablet 400 mg (400 mg Oral Given 5/10/24 0358)       CONSULTS: See ED Course/MDM for further details.            PROCEDURES   Unless

## 2024-06-20 ENCOUNTER — HOSPITAL ENCOUNTER (EMERGENCY)
Facility: HOSPITAL | Age: 44
Discharge: HOME | End: 2024-06-20
Attending: STUDENT IN AN ORGANIZED HEALTH CARE EDUCATION/TRAINING PROGRAM
Payer: MEDICAID

## 2024-06-20 ENCOUNTER — APPOINTMENT (OUTPATIENT)
Facility: HOSPITAL | Age: 44
End: 2024-06-20
Payer: MEDICAID

## 2024-06-20 VITALS
OXYGEN SATURATION: 98 % | SYSTOLIC BLOOD PRESSURE: 149 MMHG | DIASTOLIC BLOOD PRESSURE: 89 MMHG | RESPIRATION RATE: 19 BRPM | HEART RATE: 98 BPM | WEIGHT: 240 LBS | HEIGHT: 75 IN | BODY MASS INDEX: 29.84 KG/M2 | TEMPERATURE: 98.2 F

## 2024-06-20 DIAGNOSIS — S86.912A STRAIN OF LEFT KNEE, INITIAL ENCOUNTER: Primary | ICD-10-CM

## 2024-06-20 PROCEDURE — 73562 X-RAY EXAM OF KNEE 3: CPT

## 2024-06-20 PROCEDURE — 99283 EMERGENCY DEPT VISIT LOW MDM: CPT

## 2024-06-20 PROCEDURE — 6370000000 HC RX 637 (ALT 250 FOR IP): Performed by: STUDENT IN AN ORGANIZED HEALTH CARE EDUCATION/TRAINING PROGRAM

## 2024-06-20 RX ORDER — NAPROXEN 250 MG/1
250 TABLET ORAL 2 TIMES DAILY WITH MEALS
Qty: 60 TABLET | Refills: 3 | Status: SHIPPED | OUTPATIENT
Start: 2024-06-20

## 2024-06-20 RX ORDER — NAPROXEN 500 MG/1
500 TABLET ORAL
Status: COMPLETED | OUTPATIENT
Start: 2024-06-20 | End: 2024-06-20

## 2024-06-20 RX ADMIN — NAPROXEN 500 MG: 500 TABLET ORAL at 11:49

## 2024-06-20 ASSESSMENT — PAIN DESCRIPTION - LOCATION: LOCATION: KNEE

## 2024-06-20 ASSESSMENT — PAIN - FUNCTIONAL ASSESSMENT: PAIN_FUNCTIONAL_ASSESSMENT: 0-10

## 2024-06-20 ASSESSMENT — PAIN SCALES - GENERAL: PAINLEVEL_OUTOF10: 8

## 2024-06-20 ASSESSMENT — PAIN DESCRIPTION - ORIENTATION: ORIENTATION: RIGHT

## 2024-06-20 NOTE — ED PROVIDER NOTES
[facility] EMERGENCY DEPT  EMERGENCY DEPARTMENT HISTORY AND PHYSICAL EXAM      Date: 2024  Patient Name: Tyree Chase III  MRN: [id number]  YOB: 1980  Date of evaluation: 2024  Provider: Naif Farmer MD   Note Started: 11:54 AM EDT 24    HISTORY OF PRESENT ILLNESS     Chief Complaint   Patient presents with    Knee Pain       History Provided By: Patient    HPI: Tyree Chase III is a 43 y.o. male presents to the emergency department for evaluation of left knee pain x 2 weeks.  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  Patient states something similar happened previously to his right knee, was seen by an orthopedist, had an intra articular injection which helped improve the pain.  Patient denies any significant swelling denies any numbness tingling to extremities.  Patient has pain on extension, flexion, weightbearing.    PAST MEDICAL HISTORY   Past Medical History:  Past Medical History:   Diagnosis Date    Arthritis     Asthma     Bone disease        Past Surgical History:  Past Surgical History:   Procedure Laterality Date    HEENT      wisdom teeth    OTHER SURGICAL HISTORY      procedure on wrist        Family History:  Family History   Problem Relation Age of Onset    Diabetes Father        Social History:  Social History     Tobacco Use    Smoking status: Former     Current packs/day: 0.00     Types: Cigarettes     Quit date: 2006     Years since quittin.0    Smokeless tobacco: Never   Substance Use Topics    Alcohol use: Yes     Comment: beer occasionally    Drug use: Never       Allergies:  Allergies   Allergen Reactions    Fish-Derived Products        PCP: No primary care provider on file.    Current Meds:   No current facility-administered medications for this encounter.     Current Outpatient Medications   Medication Sig Dispense Refill    naproxen (NAPROSYN) 250 MG tablet Take 1  these errors. Please excuse any errors that have escaped final proofreading.)     Naif Farmer MD  06/21/24 8196

## 2024-06-20 NOTE — ED TRIAGE NOTES
Pt reports left knee pain worsening  over the past 2 weak. Pt endorses swelling and difficulty ambulating. Pt reports hx of bursitis to right knee

## 2024-08-24 ENCOUNTER — APPOINTMENT (OUTPATIENT)
Facility: HOSPITAL | Age: 44
End: 2024-08-24
Payer: MEDICAID

## 2024-08-24 ENCOUNTER — HOSPITAL ENCOUNTER (EMERGENCY)
Facility: HOSPITAL | Age: 44
Discharge: HOME | End: 2024-08-24
Attending: FAMILY MEDICINE
Payer: MEDICAID

## 2024-08-24 VITALS
HEART RATE: 72 BPM | DIASTOLIC BLOOD PRESSURE: 82 MMHG | HEIGHT: 75 IN | WEIGHT: 220 LBS | BODY MASS INDEX: 27.35 KG/M2 | RESPIRATION RATE: 18 BRPM | OXYGEN SATURATION: 98 % | SYSTOLIC BLOOD PRESSURE: 137 MMHG | TEMPERATURE: 97.9 F

## 2024-08-24 DIAGNOSIS — M25.512 ACUTE PAIN OF LEFT SHOULDER: Primary | ICD-10-CM

## 2024-08-24 PROCEDURE — 6370000000 HC RX 637 (ALT 250 FOR IP): Performed by: FAMILY MEDICINE

## 2024-08-24 PROCEDURE — 99284 EMERGENCY DEPT VISIT MOD MDM: CPT

## 2024-08-24 PROCEDURE — 73030 X-RAY EXAM OF SHOULDER: CPT

## 2024-08-24 PROCEDURE — 6360000002 HC RX W HCPCS: Performed by: FAMILY MEDICINE

## 2024-08-24 PROCEDURE — 96372 THER/PROPH/DIAG INJ SC/IM: CPT

## 2024-08-24 RX ORDER — SENNOSIDES 8.6 MG
650 CAPSULE ORAL EVERY 8 HOURS PRN
Qty: 12 TABLET | Refills: 0 | Status: SHIPPED | OUTPATIENT
Start: 2024-08-24 | End: 2024-08-28

## 2024-08-24 RX ORDER — PREDNISONE 20 MG/1
40 TABLET ORAL DAILY
Qty: 10 TABLET | Refills: 0 | Status: SHIPPED | OUTPATIENT
Start: 2024-08-24 | End: 2024-08-29

## 2024-08-24 RX ORDER — LIDOCAINE 4 G/G
PATCH TOPICAL
Qty: 5 EACH | Refills: 0 | Status: SHIPPED | OUTPATIENT
Start: 2024-08-24

## 2024-08-24 RX ORDER — LIDOCAINE 4 G/G
1 PATCH TOPICAL
Status: DISCONTINUED | OUTPATIENT
Start: 2024-08-24 | End: 2024-08-24 | Stop reason: HOSPADM

## 2024-08-24 RX ORDER — KETOROLAC TROMETHAMINE 30 MG/ML
30 INJECTION, SOLUTION INTRAMUSCULAR; INTRAVENOUS
Status: COMPLETED | OUTPATIENT
Start: 2024-08-24 | End: 2024-08-24

## 2024-08-24 RX ADMIN — KETOROLAC TROMETHAMINE 30 MG: 30 INJECTION, SOLUTION INTRAMUSCULAR at 15:02

## 2024-08-24 ASSESSMENT — LIFESTYLE VARIABLES
HOW MANY STANDARD DRINKS CONTAINING ALCOHOL DO YOU HAVE ON A TYPICAL DAY: 3 OR 4
HOW OFTEN DO YOU HAVE A DRINK CONTAINING ALCOHOL: 2-4 TIMES A MONTH

## 2024-08-24 ASSESSMENT — PAIN DESCRIPTION - PAIN TYPE: TYPE: ACUTE PAIN

## 2024-08-24 ASSESSMENT — PAIN SCALES - GENERAL: PAINLEVEL_OUTOF10: 8

## 2024-08-24 ASSESSMENT — PAIN DESCRIPTION - FREQUENCY: FREQUENCY: CONTINUOUS

## 2024-08-24 ASSESSMENT — PAIN DESCRIPTION - DESCRIPTORS: DESCRIPTORS: SPASM

## 2024-08-24 ASSESSMENT — PAIN DESCRIPTION - ORIENTATION: ORIENTATION: LEFT

## 2024-08-24 ASSESSMENT — PAIN DESCRIPTION - LOCATION: LOCATION: NECK;SHOULDER;ARM

## 2024-08-24 ASSESSMENT — PAIN DESCRIPTION - ONSET: ONSET: ON-GOING

## 2024-08-24 ASSESSMENT — PAIN - FUNCTIONAL ASSESSMENT: PAIN_FUNCTIONAL_ASSESSMENT: ACTIVITIES ARE NOT PREVENTED

## 2024-08-24 NOTE — ED PROVIDER NOTES
General: No focal deficit present.      Mental Status: He is alert and oriented to person, place, and time.   Psychiatric:         Mood and Affect: Mood normal.         Behavior: Behavior normal.         Lab and Diagnostic Study Results     Labs -   No results found for this or any previous visit (from the past 12 hour(s)).    Radiology:  Interpretation per the Radiologist below, if available at the time of this note:  XR SHOULDER LEFT (MIN 2 VIEWS)   Final Result   No acute abnormality.      Electronically signed by Peg Springer             Medical Decision Making   - I am the primary provider for this patient Jean Huerta DO  - I reviewed the vital signs, available nursing notes, past medical history, past surgical history, family history and social history.  - Initial assessment performed. The patients presenting problems have been discussed, and they are in agreement with the care plan formulated and outlined with them.  I have encouraged them to ask questions as they arise throughout their visit.    Vital Signs-Reviewed the patient's vital signs.  Vitals:    08/24/24 1436   BP: 137/82   Pulse: 72   Resp: 18   Temp: 97.9 °F (36.6 °C)   TempSrc: Oral   SpO2: 98%   Weight: 99.8 kg (220 lb)   Height: 1.905 m (6' 3\")       CONSULTS: (Who and What was discussed)  None      Chronic Conditions: Reviewed per above     Social Determinants affecting Dx or Tx: None     Records Reviewed (source and summary of external notes): Nursing notes           ED Course/ Provider Notes (Medical Decision Making):     Patient presented to the emergency department with the aforementioned chief complaint.  On examination the patient is nontoxic.  Vitals were reviewed per above.  He is neurovascularly intact.  Discussed possibility of soft tissue injury.  Lower suspicion for symptoms coming from neck as he has no neck pain and pain is isolated to his shoulder.  Will start steroid and follow-up with Ortho.          Tyree Chase III was  TYLENOL  Replaced by: acetaminophen 650 MG extended release tablet            ASK your doctor about these medications      * albuterol sulfate  (90 Base) MCG/ACT inhaler  Commonly known as: PROVENTIL;VENTOLIN;PROAIR  Inhale 2 puffs into the lungs every 6 hours as needed for Wheezing or Shortness of Breath     * albuterol sulfate  (90 Base) MCG/ACT inhaler  Commonly known as: Ventolin HFA  Inhale 2 puffs into the lungs 4 times daily as needed for Wheezing     azithromycin 250 MG tablet  Commonly known as: Zithromax Z-Saurabh  Take 2 tablets (500 mg) on Day 1, and then take 1 tablet (250 mg) on days 2 through 5.     * ibuprofen 800 MG tablet  Commonly known as: ADVIL;MOTRIN  Take 1 tablet by mouth 3 times daily (with meals)     * ibuprofen 800 MG tablet  Commonly known as: ADVIL;MOTRIN  Take 1 tablet by mouth 3 times daily (with meals)     * ibuprofen 800 MG tablet  Commonly known as: ADVIL;MOTRIN  Take 1 tablet by mouth 3 times daily (with meals)     losartan 25 MG tablet  Commonly known as: COZAAR     naproxen 250 MG tablet  Commonly known as: NAPROSYN  Take 1 tablet by mouth 2 times daily (with meals)     permethrin 5 % cream  Commonly known as: Elimite  Apply topically as directed           * This list has 5 medication(s) that are the same as other medications prescribed for you. Read the directions carefully, and ask your doctor or other care provider to review them with you.                   Where to Get Your Medications        These medications were sent to [facility]/pharmacy #[id number] - [place], [place] [address] P [phone] - F [phone]   [facility] [address]      Phone: [phone]   acetaminophen 650 MG extended release tablet  lidocaine 4 % external patch  predniSONE 20 MG tablet         4. PATIENT REFERRED TO:  Please make a follow-up appointment with your PCP as soon as possible               Diagnosis     Clinical Impression:    1. Acute pain of left shoulder         Attestations:    Jean Huerta,     Please note that this dictation was completed with [employer], the computer voice recognition software.  Quite often unanticipated grammatical, syntax, homophones, and other interpretive errors are inadvertently transcribed by the computer software.  Please disregard these errors.  Please excuse any errors that have escaped final proofreading.  Thank you.         Jean Huerta,   08/24/24 1532

## 2024-08-24 NOTE — DISCHARGE INSTRUCTIONS
Thank you for choosing our Emergency Department for your care.  It is our privilege to care for you in your time of need.  In the next several days, you may receive a survey via email or mailed to your home about your experience with our team.  We would greatly appreciate you taking a few minutes to complete the survey, as we use this information to learn what we have done well and what we could be doing better. Thank you for trusting us with your care!    Below you will find a list of your tests from today's visit.   Labs  No results found for this or any previous visit (from the past 12 hour(s)).    Radiologic Studies  XR SHOULDER LEFT (MIN 2 VIEWS)   Final Result   No acute abnormality.      Electronically signed by Peg Springer        ------------------------------------------------------------------------------------------------------------  The evaluation and treatment you received in the Emergency Department were for an urgent problem. It is important that you follow-up with a doctor, nurse practitioner, or physician assistant to:  (1) confirm your diagnosis,  (2) re-evaluation of changes in your illness and treatment, and (3) for ongoing care. Please take your discharge instructions with you when you go to your follow-up appointment.     If you have any problem arranging a follow-up appointment, contact us!  If your symptoms become worse or you do not improve as expected, please return to us. We are available 24 hours a day.     If a prescription has been provided, please fill it as soon as possible to prevent a delay in treatment. If you have any questions or reservations about taking the medication due to side effects or interactions with other medications, please call your primary care provider or contact us directly.  Again, THANK YOU for choosing us to care for YOU!

## 2024-09-23 ENCOUNTER — APPOINTMENT (OUTPATIENT)
Facility: HOSPITAL | Age: 44
End: 2024-09-23
Payer: MEDICAID

## 2024-09-23 ENCOUNTER — HOSPITAL ENCOUNTER (EMERGENCY)
Facility: HOSPITAL | Age: 44
Discharge: HOME | End: 2024-09-23
Attending: FAMILY MEDICINE
Payer: MEDICAID

## 2024-09-23 VITALS
SYSTOLIC BLOOD PRESSURE: 153 MMHG | TEMPERATURE: 97.7 F | HEIGHT: 75 IN | RESPIRATION RATE: 18 BRPM | OXYGEN SATURATION: 100 % | WEIGHT: 220 LBS | DIASTOLIC BLOOD PRESSURE: 94 MMHG | HEART RATE: 74 BPM | BODY MASS INDEX: 27.35 KG/M2

## 2024-09-23 DIAGNOSIS — M79.10 MYALGIA: ICD-10-CM

## 2024-09-23 DIAGNOSIS — R51.9 NONINTRACTABLE HEADACHE, UNSPECIFIED CHRONICITY PATTERN, UNSPECIFIED HEADACHE TYPE: Primary | ICD-10-CM

## 2024-09-23 DIAGNOSIS — M54.50 ACUTE BILATERAL LOW BACK PAIN WITHOUT SCIATICA: ICD-10-CM

## 2024-09-23 PROCEDURE — 96372 THER/PROPH/DIAG INJ SC/IM: CPT

## 2024-09-23 PROCEDURE — 70450 CT HEAD/BRAIN W/O DYE: CPT

## 2024-09-23 PROCEDURE — 99284 EMERGENCY DEPT VISIT MOD MDM: CPT

## 2024-09-23 PROCEDURE — 6360000002 HC RX W HCPCS: Performed by: FAMILY MEDICINE

## 2024-09-23 RX ORDER — CYCLOBENZAPRINE HCL 10 MG
10 TABLET ORAL 3 TIMES DAILY PRN
Qty: 15 TABLET | Refills: 0 | Status: SHIPPED | OUTPATIENT
Start: 2024-09-23 | End: 2024-10-03

## 2024-09-23 RX ORDER — SENNOSIDES 8.6 MG
650 CAPSULE ORAL EVERY 8 HOURS PRN
Qty: 12 TABLET | Refills: 0 | Status: SHIPPED | OUTPATIENT
Start: 2024-09-23 | End: 2024-09-27

## 2024-09-23 RX ORDER — PREDNISONE 20 MG/1
40 TABLET ORAL DAILY
Qty: 10 TABLET | Refills: 0 | Status: SHIPPED | OUTPATIENT
Start: 2024-09-23 | End: 2024-09-28

## 2024-09-23 RX ORDER — KETOROLAC TROMETHAMINE 30 MG/ML
30 INJECTION, SOLUTION INTRAMUSCULAR; INTRAVENOUS
Status: COMPLETED | OUTPATIENT
Start: 2024-09-23 | End: 2024-09-23

## 2024-09-23 RX ADMIN — KETOROLAC TROMETHAMINE 30 MG: 30 INJECTION, SOLUTION INTRAMUSCULAR; INTRAVENOUS at 10:35

## 2024-09-23 ASSESSMENT — PAIN SCALES - GENERAL: PAINLEVEL_OUTOF10: 10

## 2024-09-23 ASSESSMENT — PAIN - FUNCTIONAL ASSESSMENT: PAIN_FUNCTIONAL_ASSESSMENT: 0-10

## 2024-09-29 ENCOUNTER — HOSPITAL ENCOUNTER (EMERGENCY)
Facility: HOSPITAL | Age: 44
Discharge: HOME | End: 2024-09-29
Attending: STUDENT IN AN ORGANIZED HEALTH CARE EDUCATION/TRAINING PROGRAM
Payer: MEDICAID

## 2024-09-29 VITALS
DIASTOLIC BLOOD PRESSURE: 79 MMHG | SYSTOLIC BLOOD PRESSURE: 140 MMHG | WEIGHT: 220 LBS | RESPIRATION RATE: 12 BRPM | TEMPERATURE: 97.8 F | HEART RATE: 70 BPM | OXYGEN SATURATION: 100 % | BODY MASS INDEX: 28.23 KG/M2 | HEIGHT: 74 IN

## 2024-09-29 DIAGNOSIS — B35.3 TINEA PEDIS OF BOTH FEET: Primary | ICD-10-CM

## 2024-09-29 PROCEDURE — 6370000000 HC RX 637 (ALT 250 FOR IP): Performed by: STUDENT IN AN ORGANIZED HEALTH CARE EDUCATION/TRAINING PROGRAM

## 2024-09-29 PROCEDURE — 6360000002 HC RX W HCPCS: Performed by: STUDENT IN AN ORGANIZED HEALTH CARE EDUCATION/TRAINING PROGRAM

## 2024-09-29 PROCEDURE — 99283 EMERGENCY DEPT VISIT LOW MDM: CPT

## 2024-09-29 RX ORDER — CLOTRIMAZOLE 1 %
CREAM (GRAM) TOPICAL
Qty: 14 G | Refills: 0 | Status: SHIPPED | OUTPATIENT
Start: 2024-09-29 | End: 2024-10-20

## 2024-09-29 RX ORDER — CIPROFLOXACIN 500 MG/1
500 TABLET, FILM COATED ORAL 2 TIMES DAILY
Qty: 14 TABLET | Refills: 0 | Status: SHIPPED | OUTPATIENT
Start: 2024-09-29 | End: 2024-10-06

## 2024-09-29 RX ORDER — IBUPROFEN 600 MG/1
600 TABLET, FILM COATED ORAL EVERY 8 HOURS PRN
Qty: 20 TABLET | Refills: 0 | Status: SHIPPED | OUTPATIENT
Start: 2024-09-29

## 2024-09-29 RX ORDER — DIPHENHYDRAMINE HCL 25 MG
25 CAPSULE ORAL EVERY 6 HOURS PRN
Qty: 20 CAPSULE | Refills: 0 | Status: SHIPPED | OUTPATIENT
Start: 2024-09-29

## 2024-09-29 RX ORDER — DEXAMETHASONE SODIUM PHOSPHATE 10 MG/ML
6 INJECTION, SOLUTION INTRAMUSCULAR; INTRAVENOUS ONCE
Status: COMPLETED | OUTPATIENT
Start: 2024-09-29 | End: 2024-09-29

## 2024-09-29 RX ORDER — CIPROFLOXACIN 500 MG/1
500 TABLET, FILM COATED ORAL
Status: COMPLETED | OUTPATIENT
Start: 2024-09-29 | End: 2024-09-29

## 2024-09-29 RX ORDER — HYDROCODONE BITARTRATE AND ACETAMINOPHEN 5; 325 MG/1; MG/1
1 TABLET ORAL
Status: COMPLETED | OUTPATIENT
Start: 2024-09-29 | End: 2024-09-29

## 2024-09-29 RX ADMIN — HYDROCODONE BITARTRATE AND ACETAMINOPHEN 1 TABLET: 5; 325 TABLET ORAL at 06:04

## 2024-09-29 RX ADMIN — DEXAMETHASONE SODIUM PHOSPHATE 6 MG: 10 INJECTION INTRAMUSCULAR; INTRAVENOUS at 06:04

## 2024-09-29 RX ADMIN — CIPROFLOXACIN 500 MG: 500 TABLET, FILM COATED ORAL at 06:04

## 2024-09-29 ASSESSMENT — PAIN SCALES - GENERAL: PAINLEVEL_OUTOF10: 2

## 2024-09-29 ASSESSMENT — PAIN - FUNCTIONAL ASSESSMENT
PAIN_FUNCTIONAL_ASSESSMENT: 0-10
PAIN_FUNCTIONAL_ASSESSMENT: NONE - DENIES PAIN

## 2024-09-29 ASSESSMENT — LIFESTYLE VARIABLES: HOW OFTEN DO YOU HAVE A DRINK CONTAINING ALCOHOL: 2-3 TIMES A WEEK

## 2024-09-29 NOTE — DISCHARGE INSTRUCTIONS
Thank you!    Thank you for allowing me to care for you in the emergency department.  I sincerely hope that you are satisfied with your visit today.  It is my goal to provide you with excellent care.    Below you will find a list of your labs and imaging from your visit today if applicable. Should you have any questions regarding these results please do not hesitate to call the emergency department. Please review [employer] for a more detailed result list since the below list may not be comprehensive. Instructions on how to sign up to [employer] should be provided in this packet.    Labs -  No results found for this or any previous visit (from the past 12 hour(s)).    Radiologic Studies -   No orders to display          If you feel that you have not received excellent quality care or timely care, please ask to speak to the nurse manager. Please choose us in the future for your continued health care needs.   ------------------------------------------------------------------------------------------------------------  The exam and treatment you received in the Emergency Department were for an urgent problem and are not intended as complete care. It is important that you follow-up with a doctor, nurse practitioner, or physician assistant to:  (1) confirm your diagnosis,  (2) re-evaluation of changes in your illness and treatment, and  (3) for ongoing care.  If your symptoms become worse or you do not improve as expected and you are unable to reach your usual health care provider, you should return to the Emergency Department. We are available 24 hours a day.     Please take your discharge instructions with you when you go to your follow-up appointment.     If a prescription has been provided, please have it filled as soon as possible to prevent a delay in treatment. Read the entire medication instruction sheet provided to you by the pharmacy. If you have any questions or reservations about taking the medication due to side

## 2024-09-29 NOTE — ED PROVIDER NOTES
[facility]  EMERGENCY DEPARTMENT ENCOUNTER NOTE    Date: 9/29/2024  Patient Name: Tyree Chase III    History of Presenting Illness     Chief Complaint   Patient presents with    Foot Pain       History obtained from: Patient    HPI: Tyree Chase III, 43 y.o. male with past medical history as listed and reviewed below presents for rash.  The patient noticed a rash over bilateral feet between the toes and that started spreading up to the dorsum of the foot over the past 2 weeks.  No fevers or chills.  No discharge.  He has pain on ambulation. No trauma.    Medical History   I reviewed the medical, surgical, family, and social history, as well as allergies:    PCP: No primary care provider on file.    Past Medical History:  Past Medical History:   Diagnosis Date    Arthritis     Asthma     Bone disease     Hypertension      Past Surgical History:  Past Surgical History:   Procedure Laterality Date    HEENT      wisdom teeth    OTHER SURGICAL HISTORY      procedure on wrist      Current Outpatient Medications:  Current Outpatient Medications   Medication Instructions    acetaminophen (TYLENOL 8 HOUR) 650 mg, Oral, EVERY 8 HOURS PRN    albuterol sulfate HFA (PROVENTIL;VENTOLIN;PROAIR) 108 (90 Base) MCG/ACT inhaler 2 puffs, Inhalation, EVERY 6 HOURS PRN    albuterol sulfate HFA (VENTOLIN HFA) 108 (90 Base) MCG/ACT inhaler 2 puffs, Inhalation, 4 TIMES DAILY PRN    azithromycin (ZITHROMAX Z-SARATH) 250 MG tablet Take 2 tablets (500 mg) on Day 1, and then take 1 tablet (250 mg) on days 2 through 5.    ciprofloxacin (CIPRO) 500 mg, Oral, 2 TIMES DAILY    clotrimazole (CLOTRIMAZOLE ANTI-FUNGAL) 1 % cream Apply topically 2 times daily.    cyclobenzaprine (FLEXERIL) 10 mg, Oral, 3 TIMES DAILY PRN    diphenhydrAMINE (BENADRYL ALLERGY) 25 mg, Oral, EVERY 6 HOURS PRN    ibuprofen (ADVIL;MOTRIN) 800 mg, Oral, 3 TIMES DAILY WITH MEALS    ibuprofen (ADVIL;MOTRIN) 800 mg, Oral, 3 TIMES DAILY WITH

## 2024-10-31 ENCOUNTER — HOSPITAL ENCOUNTER (EMERGENCY)
Facility: HOSPITAL | Age: 44
Discharge: HOME | End: 2024-10-31
Attending: EMERGENCY MEDICINE
Payer: MEDICAID

## 2024-10-31 VITALS
HEART RATE: 87 BPM | RESPIRATION RATE: 18 BRPM | WEIGHT: 223.99 LBS | DIASTOLIC BLOOD PRESSURE: 78 MMHG | OXYGEN SATURATION: 98 % | SYSTOLIC BLOOD PRESSURE: 124 MMHG | BODY MASS INDEX: 27.85 KG/M2 | TEMPERATURE: 98.1 F | HEIGHT: 75 IN

## 2024-10-31 DIAGNOSIS — M79.672 PAIN IN BOTH FEET: ICD-10-CM

## 2024-10-31 DIAGNOSIS — J06.9 UPPER RESPIRATORY TRACT INFECTION, UNSPECIFIED TYPE: Primary | ICD-10-CM

## 2024-10-31 DIAGNOSIS — M79.671 PAIN IN BOTH FEET: ICD-10-CM

## 2024-10-31 DIAGNOSIS — M54.50 ACUTE LEFT-SIDED LOW BACK PAIN WITHOUT SCIATICA: ICD-10-CM

## 2024-10-31 LAB
APPEARANCE UR: CLEAR
BACTERIA URNS QL MICRO: NEGATIVE /HPF
BILIRUB UR QL: NEGATIVE
COLOR UR: YELLOW
EPITH CASTS URNS QL MICRO: ABNORMAL /LPF
FLUAV RNA SPEC QL NAA+PROBE: NOT DETECTED
FLUBV RNA SPEC QL NAA+PROBE: NOT DETECTED
GLUCOSE UR STRIP.AUTO-MCNC: NEGATIVE MG/DL
HGB UR QL STRIP: NEGATIVE
HYALINE CASTS URNS QL MICRO: ABNORMAL /LPF (ref 0–5)
KETONES UR QL STRIP.AUTO: ABNORMAL MG/DL
LEUKOCYTE ESTERASE UR QL STRIP.AUTO: NEGATIVE
MUCOUS THREADS URNS QL MICRO: ABNORMAL /LPF
NITRITE UR QL STRIP.AUTO: NEGATIVE
PH UR STRIP: 6 (ref 5–8)
PROT UR STRIP-MCNC: ABNORMAL MG/DL
RBC #/AREA URNS HPF: ABNORMAL /HPF (ref 0–5)
SARS-COV-2 RNA RESP QL NAA+PROBE: NOT DETECTED
SOURCE: NORMAL
SP GR UR REFRACTOMETRY: 1.03 (ref 1–1.03)
UROBILINOGEN UR QL STRIP.AUTO: 1 EU/DL (ref 0.2–1)
WBC URNS QL MICRO: ABNORMAL /HPF (ref 0–4)

## 2024-10-31 PROCEDURE — 99283 EMERGENCY DEPT VISIT LOW MDM: CPT

## 2024-10-31 PROCEDURE — 6370000000 HC RX 637 (ALT 250 FOR IP)

## 2024-10-31 PROCEDURE — 81001 URINALYSIS AUTO W/SCOPE: CPT

## 2024-10-31 PROCEDURE — 6360000002 HC RX W HCPCS

## 2024-10-31 PROCEDURE — 87636 SARSCOV2 & INF A&B AMP PRB: CPT

## 2024-10-31 RX ORDER — FLUTICASONE PROPIONATE 50 MCG
2 SPRAY, SUSPENSION (ML) NASAL DAILY
Qty: 16 G | Refills: 0 | Status: SHIPPED | OUTPATIENT
Start: 2024-10-31

## 2024-10-31 RX ORDER — METHOCARBAMOL 500 MG/1
1000 TABLET, FILM COATED ORAL 4 TIMES DAILY
Qty: 40 TABLET | Refills: 0 | Status: SHIPPED | OUTPATIENT
Start: 2024-10-31 | End: 2024-11-05

## 2024-10-31 RX ORDER — DEXAMETHASONE SODIUM PHOSPHATE 10 MG/ML
10 INJECTION, SOLUTION INTRAMUSCULAR; INTRAVENOUS ONCE
Status: DISCONTINUED | OUTPATIENT
Start: 2024-10-31 | End: 2024-10-31

## 2024-10-31 RX ORDER — NAPROXEN 250 MG/1
500 TABLET ORAL ONCE
Status: COMPLETED | OUTPATIENT
Start: 2024-10-31 | End: 2024-10-31

## 2024-10-31 RX ORDER — METHOCARBAMOL 750 MG/1
1500 TABLET, FILM COATED ORAL
Status: COMPLETED | OUTPATIENT
Start: 2024-10-31 | End: 2024-10-31

## 2024-10-31 RX ORDER — LIDOCAINE 4 G/G
1 PATCH TOPICAL
Status: DISCONTINUED | OUTPATIENT
Start: 2024-10-31 | End: 2024-10-31 | Stop reason: HOSPADM

## 2024-10-31 RX ORDER — KETOROLAC TROMETHAMINE 30 MG/ML
30 INJECTION, SOLUTION INTRAMUSCULAR; INTRAVENOUS
Status: DISCONTINUED | OUTPATIENT
Start: 2024-10-31 | End: 2024-10-31

## 2024-10-31 RX ORDER — DEXAMETHASONE SODIUM PHOSPHATE 10 MG/ML
10 INJECTION, SOLUTION INTRAMUSCULAR; INTRAVENOUS ONCE
Status: COMPLETED | OUTPATIENT
Start: 2024-10-31 | End: 2024-10-31

## 2024-10-31 RX ORDER — GUAIFENESIN 600 MG/1
1200 TABLET, EXTENDED RELEASE ORAL 2 TIMES DAILY
Qty: 20 TABLET | Refills: 0 | Status: SHIPPED | OUTPATIENT
Start: 2024-10-31 | End: 2024-11-05

## 2024-10-31 RX ADMIN — NAPROXEN 500 MG: 250 TABLET ORAL at 17:44

## 2024-10-31 RX ADMIN — METHOCARBAMOL TABLETS 1500 MG: 750 TABLET, COATED ORAL at 17:44

## 2024-10-31 RX ADMIN — DEXAMETHASONE SODIUM PHOSPHATE 10 MG: 10 INJECTION, SOLUTION INTRAMUSCULAR; INTRAVENOUS at 17:44

## 2024-10-31 ASSESSMENT — PAIN DESCRIPTION - LOCATION: LOCATION: BACK

## 2024-10-31 ASSESSMENT — PAIN SCALES - GENERAL: PAINLEVEL_OUTOF10: 7

## 2024-10-31 ASSESSMENT — PAIN DESCRIPTION - DESCRIPTORS: DESCRIPTORS: ACHING

## 2024-10-31 ASSESSMENT — PAIN DESCRIPTION - ORIENTATION: ORIENTATION: LOWER
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
## 2024-10-31 NOTE — DISCHARGE INSTRUCTIONS
Thank you for allowing us to provide you with medical care today.  We realize that you have many choices for your emergency care needs.  We thank you for choosing [facility].  Please choose us in the future for any continued health care needs.     We hope we addressed all of your medical concerns. We strive to provide excellent quality care in the Emergency Department.  Anything less than excellent does not meet our expectations.     The exam and treatment you received in the Emergency Department were for an emergent problem and are not intended as complete care. It is important that you follow up with a doctor, nurse practitioner, or physician’s assistant for ongoing care. If your symptoms worsen or you do not improve as expected and you are unable to reach your usual health care provider, you should return to the Emergency Department. We are available 24 hours a day.     Take this sheet with you when you go to your follow-up visit.     If you have any problem arranging the follow-up visit, contact the Emergency Department immediately.     Make an appointment your family doctor for follow up of this visit. Return to the ER if you are unable to be seen in a timely manner.     Below is a summary of your results.    Labs  Recent Results (from the past 12 hour(s))   COVID-19 & Influenza Combo    Collection Time: 10/31/24  5:50 PM    Specimen: Nasopharyngeal   Result Value Ref Range    Source Nasopharyngeal      SARS-CoV-2, PCR Not detected NOTD      Rapid Influenza A By PCR Not detected NOTD      Rapid Influenza B By PCR Not detected NOTD     Urinalysis with Microscopic    Collection Time: 10/31/24  5:50 PM   Result Value Ref Range    Color, UA YELLOW      Appearance CLEAR CLEAR      Specific Gravity, UA 1.030 1.003 - 1.030      pH, Urine 6.0 5.0 - 8.0      Protein, UA TRACE (A) NEG mg/dL    Glucose, Ur Negative NEG mg/dL    Ketones, Urine TRACE (A) NEG mg/dL    Bilirubin, Urine Negative NEG

## 2025-07-09 ENCOUNTER — HOSPITAL ENCOUNTER (EMERGENCY)
Facility: HOSPITAL | Age: 45
Discharge: HOME | End: 2025-07-09
Attending: FAMILY MEDICINE
Payer: MEDICAID

## 2025-07-09 VITALS
DIASTOLIC BLOOD PRESSURE: 87 MMHG | BODY MASS INDEX: 27.35 KG/M2 | WEIGHT: 220 LBS | HEART RATE: 84 BPM | OXYGEN SATURATION: 97 % | SYSTOLIC BLOOD PRESSURE: 168 MMHG | HEIGHT: 75 IN | RESPIRATION RATE: 16 BRPM | TEMPERATURE: 97.7 F

## 2025-07-09 DIAGNOSIS — L28.2 PRURITIC RASH: ICD-10-CM

## 2025-07-09 DIAGNOSIS — L30.9 DERMATITIS: Primary | ICD-10-CM

## 2025-07-09 PROCEDURE — 96372 THER/PROPH/DIAG INJ SC/IM: CPT

## 2025-07-09 PROCEDURE — 99284 EMERGENCY DEPT VISIT MOD MDM: CPT

## 2025-07-09 PROCEDURE — 6360000002 HC RX W HCPCS: Performed by: FAMILY MEDICINE

## 2025-07-09 RX ORDER — METHYLPREDNISOLONE SODIUM SUCCINATE 125 MG/2ML
125 INJECTION INTRAMUSCULAR; INTRAVENOUS ONCE
Status: COMPLETED | OUTPATIENT
Start: 2025-07-09 | End: 2025-07-09

## 2025-07-09 RX ORDER — CETIRIZINE HYDROCHLORIDE 10 MG/1
10 TABLET ORAL DAILY
Qty: 14 TABLET | Refills: 0 | Status: SHIPPED | OUTPATIENT
Start: 2025-07-09 | End: 2025-07-09

## 2025-07-09 RX ORDER — PREDNISONE 20 MG/1
40 TABLET ORAL DAILY
Qty: 10 TABLET | Refills: 0 | Status: SHIPPED | OUTPATIENT
Start: 2025-07-09 | End: 2025-07-09

## 2025-07-09 RX ORDER — CETIRIZINE HYDROCHLORIDE 10 MG/1
10 TABLET ORAL DAILY
Qty: 14 TABLET | Refills: 0 | Status: SHIPPED | OUTPATIENT
Start: 2025-07-09 | End: 2025-07-23

## 2025-07-09 RX ORDER — PREDNISONE 20 MG/1
40 TABLET ORAL DAILY
Qty: 10 TABLET | Refills: 0 | Status: SHIPPED | OUTPATIENT
Start: 2025-07-09 | End: 2025-07-14

## 2025-07-09 RX ADMIN — METHYLPREDNISOLONE SODIUM SUCCINATE 125 MG: 125 INJECTION INTRAMUSCULAR; INTRAVENOUS at 12:53

## 2025-07-09 ASSESSMENT — LIFESTYLE VARIABLES
HOW OFTEN DO YOU HAVE A DRINK CONTAINING ALCOHOL: 2-3 TIMES A WEEK
HOW MANY STANDARD DRINKS CONTAINING ALCOHOL DO YOU HAVE ON A TYPICAL DAY: 3 OR 4

## 2025-07-09 ASSESSMENT — PAIN - FUNCTIONAL ASSESSMENT: PAIN_FUNCTIONAL_ASSESSMENT: NONE - DENIES PAIN

## 2025-07-09 NOTE — ED PROVIDER NOTES
EMERGENCY DEPARTMENT HISTORY AND PHYSICAL EXAM      Date: 7/9/2025  Patient Name: Tyree Chase III    History of Presenting Illness     Chief Complaint   Patient presents with    Rash       History Provided By:     HPI: Tyree Chase III, is a very pleasant 44 y.o. male presenting to the ED with a chief complaint of itchy rash.  Symptoms started 1 week ago.  Denies any clear inciting triggers.  No involvement of palms or soles.  No involvement of mucous membranes.  No blistering.  No fever rash primarily on extremities    Denies any other symptoms at this time.    PCP: No primary care provider on file.    No current facility-administered medications on file prior to encounter.     Current Outpatient Medications on File Prior to Encounter   Medication Sig Dispense Refill    fluticasone (FLONASE) 50 MCG/ACT nasal spray 2 sprays by Each Nostril route daily 16 g 0    ibuprofen (ADVIL;MOTRIN) 600 MG tablet Take 1 tablet by mouth every 8 hours as needed for Pain 20 tablet 0    Acetaminophen (TYLENOL) 325 MG CAPS Take 650 mg by mouth every 6 hours as needed (for pain or fever) 20 capsule 0    diphenhydrAMINE (BENADRYL ALLERGY) 25 MG capsule Take 1 capsule by mouth every 6 hours as needed for Itching 20 capsule 0    acetaminophen (TYLENOL 8 HOUR) 650 MG extended release tablet Take 1 tablet by mouth every 8 hours as needed for Pain 12 tablet 0    lidocaine 4 % external patch Apply to area of pain not to exceed 12 hours/day 5 each 0    naproxen (NAPROSYN) 250 MG tablet Take 1 tablet by mouth 2 times daily (with meals) 60 tablet 3    albuterol sulfate HFA (VENTOLIN HFA) 108 (90 Base) MCG/ACT inhaler Inhale 2 puffs into the lungs 4 times daily as needed for Wheezing 54 g 1    azithromycin (ZITHROMAX Z-SARATH) 250 MG tablet Take 2 tablets (500 mg) on Day 1, and then take 1 tablet (250 mg) on days 2 through 5. 1 packet 0    ibuprofen (ADVIL;MOTRIN) 800 MG tablet Take 1 tablet by mouth 3 times daily (with meals) (Patient not